# Patient Record
Sex: MALE | Race: WHITE | Employment: FULL TIME | URBAN - METROPOLITAN AREA
[De-identification: names, ages, dates, MRNs, and addresses within clinical notes are randomized per-mention and may not be internally consistent; named-entity substitution may affect disease eponyms.]

---

## 2017-01-07 ENCOUNTER — TRANSFERRED RECORDS (OUTPATIENT)
Dept: HEALTH INFORMATION MANAGEMENT | Facility: CLINIC | Age: 37
End: 2017-01-07

## 2017-02-24 ENCOUNTER — OFFICE VISIT (OUTPATIENT)
Dept: FAMILY MEDICINE | Facility: CLINIC | Age: 37
End: 2017-02-24
Payer: COMMERCIAL

## 2017-02-24 VITALS
HEIGHT: 73 IN | TEMPERATURE: 98 F | SYSTOLIC BLOOD PRESSURE: 98 MMHG | WEIGHT: 175 LBS | BODY MASS INDEX: 23.19 KG/M2 | HEART RATE: 63 BPM | DIASTOLIC BLOOD PRESSURE: 60 MMHG | OXYGEN SATURATION: 98 %

## 2017-02-24 DIAGNOSIS — F41.1 GENERALIZED ANXIETY DISORDER: ICD-10-CM

## 2017-02-24 DIAGNOSIS — F42.9 OCD (OBSESSIVE COMPULSIVE DISORDER): ICD-10-CM

## 2017-02-24 DIAGNOSIS — F41.1 ANXIETY REACTION: ICD-10-CM

## 2017-02-24 DIAGNOSIS — Z00.00 ROUTINE GENERAL MEDICAL EXAMINATION AT A HEALTH CARE FACILITY: Primary | ICD-10-CM

## 2017-02-24 DIAGNOSIS — Z80.0 FAMILY HISTORY OF COLON CANCER: ICD-10-CM

## 2017-02-24 LAB
ANION GAP SERPL CALCULATED.3IONS-SCNC: 5 MMOL/L (ref 3–14)
BUN SERPL-MCNC: 13 MG/DL (ref 7–30)
CALCIUM SERPL-MCNC: 9.3 MG/DL (ref 8.5–10.1)
CHLORIDE SERPL-SCNC: 105 MMOL/L (ref 94–109)
CHOLEST SERPL-MCNC: 218 MG/DL
CO2 SERPL-SCNC: 29 MMOL/L (ref 20–32)
CREAT SERPL-MCNC: 0.94 MG/DL (ref 0.66–1.25)
ERYTHROCYTE [DISTWIDTH] IN BLOOD BY AUTOMATED COUNT: 12.5 % (ref 10–15)
GFR SERPL CREATININE-BSD FRML MDRD: 89 ML/MIN/1.7M2
GLUCOSE SERPL-MCNC: 79 MG/DL (ref 70–99)
HCT VFR BLD AUTO: 43.6 % (ref 40–53)
HDLC SERPL-MCNC: 66 MG/DL
HGB BLD-MCNC: 15.4 G/DL (ref 13.3–17.7)
LDLC SERPL CALC-MCNC: 140 MG/DL
MCH RBC QN AUTO: 30.3 PG (ref 26.5–33)
MCHC RBC AUTO-ENTMCNC: 35.3 G/DL (ref 31.5–36.5)
MCV RBC AUTO: 86 FL (ref 78–100)
NONHDLC SERPL-MCNC: 152 MG/DL
PLATELET # BLD AUTO: 302 10E9/L (ref 150–450)
POTASSIUM SERPL-SCNC: 4.5 MMOL/L (ref 3.4–5.3)
RBC # BLD AUTO: 5.09 10E12/L (ref 4.4–5.9)
SODIUM SERPL-SCNC: 139 MMOL/L (ref 133–144)
TRIGL SERPL-MCNC: 58 MG/DL
WBC # BLD AUTO: 5 10E9/L (ref 4–11)

## 2017-02-24 PROCEDURE — 99395 PREV VISIT EST AGE 18-39: CPT | Performed by: FAMILY MEDICINE

## 2017-02-24 PROCEDURE — 36415 COLL VENOUS BLD VENIPUNCTURE: CPT | Performed by: FAMILY MEDICINE

## 2017-02-24 PROCEDURE — 85027 COMPLETE CBC AUTOMATED: CPT | Performed by: FAMILY MEDICINE

## 2017-02-24 PROCEDURE — 80048 BASIC METABOLIC PNL TOTAL CA: CPT | Performed by: FAMILY MEDICINE

## 2017-02-24 PROCEDURE — 80061 LIPID PANEL: CPT | Performed by: FAMILY MEDICINE

## 2017-02-24 RX ORDER — ESCITALOPRAM OXALATE 20 MG/1
10-20 TABLET ORAL DAILY
Qty: 90 TABLET | Refills: 3 | Status: SHIPPED | OUTPATIENT
Start: 2017-02-24 | End: 2018-04-28

## 2017-02-24 ASSESSMENT — ANXIETY QUESTIONNAIRES
3. WORRYING TOO MUCH ABOUT DIFFERENT THINGS: NOT AT ALL
GAD7 TOTAL SCORE: 2
7. FEELING AFRAID AS IF SOMETHING AWFUL MIGHT HAPPEN: SEVERAL DAYS
IF YOU CHECKED OFF ANY PROBLEMS ON THIS QUESTIONNAIRE, HOW DIFFICULT HAVE THESE PROBLEMS MADE IT FOR YOU TO DO YOUR WORK, TAKE CARE OF THINGS AT HOME, OR GET ALONG WITH OTHER PEOPLE: NOT DIFFICULT AT ALL
1. FEELING NERVOUS, ANXIOUS, OR ON EDGE: SEVERAL DAYS
5. BEING SO RESTLESS THAT IT IS HARD TO SIT STILL: NOT AT ALL
6. BECOMING EASILY ANNOYED OR IRRITABLE: NOT AT ALL
2. NOT BEING ABLE TO STOP OR CONTROL WORRYING: NOT AT ALL

## 2017-02-24 ASSESSMENT — PATIENT HEALTH QUESTIONNAIRE - PHQ9: 5. POOR APPETITE OR OVEREATING: NOT AT ALL

## 2017-02-24 NOTE — NURSING NOTE
"Chief Complaint   Patient presents with     Physical       Initial BP 98/60  Pulse 63  Temp 98  F (36.7  C) (Oral)  Ht 6' 1\" (1.854 m)  Wt 175 lb (79.4 kg)  SpO2 98%  BMI 23.09 kg/m2 Estimated body mass index is 23.09 kg/(m^2) as calculated from the following:    Height as of this encounter: 6' 1\" (1.854 m).    Weight as of this encounter: 175 lb (79.4 kg).  Medication Reconciliation: complete  "

## 2017-02-24 NOTE — MR AVS SNAPSHOT
After Visit Summary   2/24/2017    Ad Dubon    MRN: [id number]           Patient Information     Date Of Birth          1980        Visit Information        Provider Department      2/24/2017 8:00 AM Saul Neal MD [facility]        Today's Diagnoses     Routine general medical examination at a health care facility    -  1    OCD (obsessive compulsive disorder)        Generalized anxiety disorder        Family history of colon cancer - father dx at 39 yo; pt tested neg for RODRÍGUEZ ;  sister tested positive for RODRÍGUEZ syndrome        Anxiety reaction          Care Instructions      Preventive Health Recommendations  Male Ages 26 - 39    Yearly exam:             See your health care provider every year in order to  o   Review health changes.   o   Discuss preventive care.    o   Review your medicines if your doctor has prescribed any.    You should be tested each year for STDs (sexually transmitted diseases), if you re at risk.     After age 35, talk to your provider about cholesterol testing. If you are at risk for heart disease, have your cholesterol tested at least every 5 years.     If you are at risk for diabetes, you should have a diabetes test (fasting glucose).  Shots: Get a flu shot each year. Get a tetanus shot every 10 years.     Nutrition:    Eat at least 5 servings of fruits and vegetables daily.     Eat whole-grain bread, whole-wheat pasta and brown rice instead of white grains and rice.     Talk to your provider about Calcium and Vitamin D.     Lifestyle    Exercise for at least 150 minutes a week (30 minutes a day, 5 days a week). This will help you control your weight and prevent disease.     Limit alcohol to one drink per day.     No smoking.     Wear sunscreen to prevent skin cancer.     See your dentist every six months for an exam and cleaning.           Follow-ups after your visit        Follow-up notes from your care team     Return in about 1 year  "(around 2/24/2018) for Wellness Exam and fasting labs.      Who to contact     If you have questions or need follow up information about today's clinic visit or your schedule please contact [facility] directly at [phone].  Normal or non-critical lab and imaging results will be communicated to you by [employer], letter or phone within 4 business days after the clinic has received the results. If you do not hear from us within 7 days, please contact the clinic through [employer] or phone. If you have a critical or abnormal lab result, we will notify you by phone as soon as possible.  Submit refill requests through [employer] or call your pharmacy and they will forward the refill request to us. Please allow 3 business days for your refill to be completed.          Additional Information About Your Visit        [employer] Information     [employer] gives you secure access to your electronic health record. If you see a primary care provider, you can also send messages to your care team and make appointments. If you have questions, please call your primary care clinic.  If you do not have a primary care provider, please call [phone] and they will assist you.        Care EveryWhere ID     This is your Care EveryWhere ID. This could be used by other organizations to access your [place] medical records  [id number]        Your Vitals Were     Pulse Temperature Height Pulse Oximetry BMI (Body Mass Index)       63 98  F (36.7  C) (Oral) 6' 1\" (1.854 m) 98% 23.09 kg/m2        Blood Pressure from Last 3 Encounters:   02/24/17 98/60   07/09/16 128/80   08/28/15 120/72    Weight from Last 3 Encounters:   02/24/17 175 lb (79.4 kg)   12/06/16 175 lb (79.4 kg)   07/09/16 175 lb 7 oz (79.6 kg)              We Performed the Following     Basic metabolic panel     CBC with platelets     Lipid panel reflex to direct LDL          Today's Medication Changes          These changes are accurate as of: 2/24/17  8:32 AM.  If you " have any questions, ask your nurse or doctor.               These medicines have changed or have updated prescriptions.        Dose/Directions    escitalopram 20 MG tablet   Commonly known as:  LEXAPRO   This may have changed:  how much to take   Used for:  OCD (obsessive compulsive disorder), Generalized anxiety disorder   Changed by:  Saul Neal MD        Dose:  10-20 mg   Take 0.5-1 tablets (10-20 mg) by mouth daily   Quantity:  90 tablet   Refills:  3         Stop taking these medicines if you haven't already. Please contact your care team if you have questions.     albuterol 108 (90 BASE) MCG/ACT Inhaler   Commonly known as:  albuterol   Stopped by:  Saul Neal MD           azithromycin 250 MG tablet   Commonly known as:  ZITHROMAX   Stopped by:  Saul Neal MD           clonazePAM 0.5 MG tablet   Commonly known as:  klonoPIN   Stopped by:  Saul Neal MD           triamcinolone 0.5 % cream   Commonly known as:  KENALOG   Stopped by:  Saul Neal MD                Where to get your medicines      These medications were sent to [place] [address] [facility] [address]  [address] [facility] [address]     Phone:  [phone]     escitalopram 20 MG tablet                Primary Care Provider Office Phone # Fax #    Saul Neal [phone]       [address] [address]        Thank you!     Thank you for choosing [facility]  for your care. Our goal is always to provide you with excellent care. Hearing back from our patients is one way we can continue to improve our services. Please take a few minutes to complete the written survey that you may receive in the mail after your visit with us. Thank you!             Your Updated Medication List - Protect others around you: Learn how to safely use, store and throw away your medicines at www.disposemymeds.org.          This  list is accurate as of: 2/24/17  8:32 AM.  Always use your most recent med list.                   Brand Name Dispense Instructions for use    escitalopram 20 MG tablet    LEXAPRO    90 tablet    Take 0.5-1 tablets (10-20 mg) by mouth daily

## 2017-02-24 NOTE — PROGRESS NOTES
SUBJECTIVE:     CC: Ad Dubon is an 36 year old male who presents for preventative health visit.     Healthy Habits:    Do you get at least three servings of calcium containing foods daily (dairy, green leafy vegetables, etc.)? yes    Amount of exercise or daily activities, outside of work: 3-4 day(s) per week    Problems taking medications regularly No    Medication side effects: No    Have you had an eye exam in the past two years? no    Do you see a dentist twice per year? no    Do you have sleep apnea, excessive snoring or daytime drowsiness?no    Lower Back Pain: Mild pain, when picking up his kids    SOB: Pt says this has been improved and he attributes the SOB to his anxiety. He said it was worst in the Spring.     Anxiety Follow-Up    Status since last visit: Improved     Other associated symptoms:None    Complicating factors:   Significant life event: No   Current substance abuse: None  Side effects of Lexapro: gets headaches at the high dose, so he sometimes takes half of a pill  Depression symptoms: No  Patient reports that the Clonazepam is helpful to have in the summer and knowing that he has it with him help him to feel better.   TORRI-7 SCORE 5/26/2015 7/23/2015 8/28/2015   Total Score 16 5 -   Total Score - - 12        GAD7       Today's PHQ-2 Score:   PHQ-2 ( 1999 [employer]) 7/9/2016 5/26/2015   Q1: Little interest or pleasure in doing things 0 0   Q2: Feeling down, depressed or hopeless 0 0   PHQ-2 Score 0 0   Little interest or pleasure in doing things - -   Feeling down, depressed or hopeless - -   PHQ-2 Score - -       Abuse: Current or Past(Physical, Sexual or Emotional)- No  Do you feel safe in your environment - Yes    Social History   Substance Use Topics     Smoking status: Former Smoker     Quit date: 4/21/2008     Smokeless tobacco: Never Used     Alcohol use 0.0 oz/week      Comment: twice a month a couple drinks each time avg     The patient does not drink >3 drinks per day nor >7  "drinks per week.    Last PSA: No results found for: PSA    Recent Labs   Lab Test  09/29/14   0913  10/25/12   0842   CHOL  187  195   HDL  70  53   LDL  107  128   TRIG  52  67   CHOLHDLRATIO  2.7  3.7       Reviewed orders with patient. Reviewed health maintenance and updated orders accordingly - Yes    All Histories reviewed and updated in Epic.    ROS:  Constitutional, HEENT, cardiovascular, pulmonary, GI, , musculoskeletal, neuro, skin, endocrine and psych systems are negative, except as in HPI or otherwise noted     This document serves as a record of the services and decisions personally performed and made by Saul Neal MD. It was created on his behalf by Sylwia Miles, a trained medical scribe. The creation of this document is based the provider's statements to the medical scribe.  Sylwia Miles     Problem list, Medication list, Allergies, and Medical/Social/Surgical histories reviewed in [facility] and updated as appropriate.  OBJECTIVE:     BP 98/60  Pulse 63  Temp 98  F (36.7  C) (Oral)  Ht 1.854 m (6' 1\")  Wt 79.4 kg (175 lb)  SpO2 98%  BMI 23.09 kg/m2  EXAM:  GENERAL: healthy, alert and no distress  EYES: Eyes grossly normal to inspection, PERRL and conjunctivae and sclerae normal  HENT: ear canals and TM's normal, nose and mouth without ulcers or lesions  NECK: no adenopathy, no asymmetry, masses, or scars and thyroid normal to palpation  RESP: lungs clear to auscultation - no rales, rhonchi or wheezes  BREAST: normal without masses, tenderness or nipple discharge and no palpable axillary masses or adenopathy  CV: regular rate and rhythm, normal S1 S2, no S3 or S4, no murmur, no carotid bruits, click or rub, no peripheral edema and peripheral pulses strong  ABDOMEN: soft, nontender, no hepatosplenomegaly, no masses and bowel sounds normal   (male): normal male genitalia without lesions or urethral discharge, no hernia  RECTAL: normal sphincter tone, no rectal masses, prostate normal size, smooth, " "nontender without nodules or masses  MS: no gross musculoskeletal defects noted, no edema  SKIN: no suspicious lesions or rashes  NEURO: Normal strength and tone, mentation intact and speech normal  PSYCH: mentation appears normal, affect normal/bright  No results found for this or any previous visit (from the past 24 hour(s)).  ASSESSMENT/PLAN:     Ad was seen today for physical.    Diagnoses and all orders for this visit:    Routine general medical examination at a health care facility  -     Lipid panel reflex to direct LDL  -     CBC with platelets  -     Basic metabolic panel    OCD (obsessive compulsive disorder): - controlled - continue medication.  -     escitalopram (LEXAPRO) 20 MG tablet; Take 0.5-1 tablets (10-20 mg) by mouth daily    Generalized anxiety disorder: - controlled - continue medication.  -     escitalopram (LEXAPRO) 20 MG tablet; Take 0.5-1 tablets (10-20 mg) by mouth daily    Family history of colon cancer - father dx at 39 yo; pt tested neg for RODRÍGUEZ ;  sister tested positive for RODRÍGUEZ syndrome    Anxiety reaction      COUNSELING:  Reviewed preventive health counseling, as reflected in patient instructions       Regular exercise       Healthy diet/nutrition       Vision screening       Hearing screening       Colon cancer screening     reports that he quit smoking about 8 years ago. He has never used smokeless tobacco.    Estimated body mass index is 23.09 kg/(m^2) as calculated from the following:    Height as of this encounter: 1.854 m (6' 1\").    Weight as of this encounter: 79.4 kg (175 lb).     Counseling Resources:  ATP IV Guidelines  Pooled Cohorts Equation Calculator  FRAX Risk Assessment  ICSI Preventive Guidelines  Dietary Guidelines for Americans, 2010  USDA's MyPlate  ASA Prophylaxis  Lung CA Screening    The information in this document, created by the medical scribe for me, accurately reflects the services I personally performed and the decisions made by me. I have reviewed " and approved this document for accuracy prior to leaving the patient care area.  Saul Neal MD February 24, 2017 7:34 AM     Saul Neal MD  [facility] [place]

## 2017-02-25 ASSESSMENT — ANXIETY QUESTIONNAIRES: GAD7 TOTAL SCORE: 2

## 2017-02-25 ASSESSMENT — PATIENT HEALTH QUESTIONNAIRE - PHQ9: SUM OF ALL RESPONSES TO PHQ QUESTIONS 1-9: 0

## 2017-04-24 ENCOUNTER — OFFICE VISIT (OUTPATIENT)
Dept: FAMILY MEDICINE | Facility: CLINIC | Age: 37
End: 2017-04-24
Payer: COMMERCIAL

## 2017-04-24 VITALS
OXYGEN SATURATION: 99 % | HEART RATE: 58 BPM | WEIGHT: 178 LBS | SYSTOLIC BLOOD PRESSURE: 104 MMHG | TEMPERATURE: 97.8 F | DIASTOLIC BLOOD PRESSURE: 80 MMHG | BODY MASS INDEX: 23.59 KG/M2 | HEIGHT: 73 IN

## 2017-04-24 DIAGNOSIS — H10.023 PINK EYE DISEASE OF BOTH EYES: Primary | ICD-10-CM

## 2017-04-24 PROCEDURE — 99213 OFFICE O/P EST LOW 20 MIN: CPT | Performed by: FAMILY MEDICINE

## 2017-04-24 RX ORDER — TOBRAMYCIN 3 MG/ML
1 SOLUTION/ DROPS OPHTHALMIC EVERY 4 HOURS
Qty: 1 BOTTLE | Refills: 0 | Status: SHIPPED | OUTPATIENT
Start: 2017-04-24 | End: 2017-05-01

## 2017-04-24 NOTE — NURSING NOTE
"Chief Complaint   Patient presents with     Eye Problem       Initial /80 (BP Location: Right arm, Patient Position: Chair, Cuff Size: Adult Large)  Pulse 58  Temp 97.8  F (36.6  C) (Oral)  Ht 6' 1\" (1.854 m)  Wt 178 lb (80.7 kg)  SpO2 99%  BMI 23.48 kg/m2 Estimated body mass index is 23.48 kg/(m^2) as calculated from the following:    Height as of this encounter: 6' 1\" (1.854 m).    Weight as of this encounter: 178 lb (80.7 kg).  Medication Reconciliation: complete  "

## 2017-04-24 NOTE — PATIENT INSTRUCTIONS
Conjunctivitis  What is eye inflammation?   The clear membrane that lines the inside of the eyelids and covers the white of the eye (conjunctiva) can get red and swollen. This is called conjunctivitis.   How does it occur?   Conjunctivitis can be caused by many things, including infection by viruses or bacteria. Many kinds of bacteria can cause conjunctivitis. These include bacteria that cause strep, staph, and STD infections.   Conjunctivitis caused by a virus is sometimes called pink eye. It can be spread easily to other people. The same viruses that cause the common cold can cause viral conjunctivitis. Viruses can be spread by coughing or sneezing and can get in your eyes through contact with infected:  hands   washcloths or towels   cosmetics   false eyelashes   soft contact lenses  Avoid unnecessary contact with others so that you do not spread the disease.   What are the symptoms?   Symptoms may include:  itchy or scratchy eyes   redness   painful sensitivity to light   swelling of eyelids   matting of eyelashes   watery or pus discharge  How is it diagnosed?   Your healthcare provider will ask about your medical history and if you have been near someone who has conjunctivitis. Your provider will examine your eyes. He or she will also check for enlarged lymph nodes near your ear and jaw. Your provider may get lab tests of a sample of the pus to see what type of germs are present.  How is it treated?   Like a cold, viral conjunctivitis will usually go away on its own without treatment. However, your healthcare provider may prescribe eyedrops to help control your symptoms. Antihistamine pills may also relieve the itching and redness.  If you have bacterial conjunctivitis, your healthcare provider will prescribe antibiotic eyedrops. You can also help your eyes get better by washing them gently to remove any pus or crusts. Then dry them gently with a clean towel.  For very severe forms of  conjunctivitis, antibiotics may need to be given by mouth or with a shot or an IV.  If you wear contact lenses, you will need to stop wearing them until your eyes are healed. The combination of contacts and conjunctivitis may damage your cornea (the clear outer layer on the front of your eye) and cause severe vision problems. Your provider may ask you to throw away your current contact lenses and lens case.  How long will the effects last?   Viral conjunctivitis usually gets worse 5 to 7 days after the first symptoms. It can get better in 10 days to 1 month. If only one eye is affected at first, the other eye may become infected up to 2 weeks later. Usually, if both eyes are affected, the first eye has worse conjunctivitis than the second.  Bacterial conjunctivitis should improve within 2 days after you begin using antibiotics. If your eyes are not better after 3 days of antibiotics, call your healthcare provider.  How can I prevent conjunctivitis?   To keep from getting conjunctivitis from someone who has it, or to keep from spreading it to others, follow these guidelines:  Wash your hands often. Do not touch or rub your eyes.   Never share eye makeup or cosmetics with anyone. When you have conjunctivitis, throw out eye makeup you have been using.   Never use eye medicine that has been prescribed for someone else.   Do not share towels, washcloths, pillows, or sheets with anyone. If one of your eyes is affected but not the other, use a separate towel for each eye.   Avoid swimming in swimming pools if you have conjunctivitis.   Avoid close contact with people until your symptoms improve. Depending on your job, you may be asked to take some time off from work.  When should I call my healthcare provider?   Call your provider if:  You have any severe eye pain.   Your symptoms do not improve after you have used your medicine for 3 days (if you have bacterial conjunctivitis).   Your symptoms do not improve after 2 weeks  (if you have viral conjunctivitis).   Your eyes get very sensitive to light, even after the redness is gone.  Reviewed for medical accuracy by faculty at the [facility]. Web site: [email or web address]

## 2017-04-24 NOTE — MR AVS SNAPSHOT
After Visit Summary   4/24/2017    Ad Dubon    MRN: [id number]           Patient Information     Date Of Birth          1980        Visit Information        Provider Department      4/24/2017 3:00 PM Saul Neal MD [facility] [place]        Today's Diagnoses     Pink eye disease of both eyes    -  1      Care Instructions                    Conjunctivitis  What is eye inflammation?   The clear membrane that lines the inside of the eyelids and covers the white of the eye (conjunctiva) can get red and swollen. This is called conjunctivitis.   How does it occur?   Conjunctivitis can be caused by many things, including infection by viruses or bacteria. Many kinds of bacteria can cause conjunctivitis. These include bacteria that cause strep, staph, and STD infections.   Conjunctivitis caused by a virus is sometimes called pink eye. It can be spread easily to other people. The same viruses that cause the common cold can cause viral conjunctivitis. Viruses can be spread by coughing or sneezing and can get in your eyes through contact with infected:  hands   washcloths or towels   cosmetics   false eyelashes   soft contact lenses  Avoid unnecessary contact with others so that you do not spread the disease.   What are the symptoms?   Symptoms may include:  itchy or scratchy eyes   redness   painful sensitivity to light   swelling of eyelids   matting of eyelashes   watery or pus discharge  How is it diagnosed?   Your healthcare provider will ask about your medical history and if you have been near someone who has conjunctivitis. Your provider will examine your eyes. He or she will also check for enlarged lymph nodes near your ear and jaw. Your provider may get lab tests of a sample of the pus to see what type of germs are present.  How is it treated?   Like a cold, viral conjunctivitis will usually go away on its own without treatment. However, your healthcare provider may prescribe  eyedrops to help control your symptoms. Antihistamine pills may also relieve the itching and redness.  If you have bacterial conjunctivitis, your healthcare provider will prescribe antibiotic eyedrops. You can also help your eyes get better by washing them gently to remove any pus or crusts. Then dry them gently with a clean towel.  For very severe forms of conjunctivitis, antibiotics may need to be given by mouth or with a shot or an IV.  If you wear contact lenses, you will need to stop wearing them until your eyes are healed. The combination of contacts and conjunctivitis may damage your cornea (the clear outer layer on the front of your eye) and cause severe vision problems. Your provider may ask you to throw away your current contact lenses and lens case.  How long will the effects last?   Viral conjunctivitis usually gets worse 5 to 7 days after the first symptoms. It can get better in 10 days to 1 month. If only one eye is affected at first, the other eye may become infected up to 2 weeks later. Usually, if both eyes are affected, the first eye has worse conjunctivitis than the second.  Bacterial conjunctivitis should improve within 2 days after you begin using antibiotics. If your eyes are not better after 3 days of antibiotics, call your healthcare provider.  How can I prevent conjunctivitis?   To keep from getting conjunctivitis from someone who has it, or to keep from spreading it to others, follow these guidelines:  Wash your hands often. Do not touch or rub your eyes.   Never share eye makeup or cosmetics with anyone. When you have conjunctivitis, throw out eye makeup you have been using.   Never use eye medicine that has been prescribed for someone else.   Do not share towels, washcloths, pillows, or sheets with anyone. If one of your eyes is affected but not the other, use a separate towel for each eye.   Avoid swimming in swimming pools if you have conjunctivitis.   Avoid close contact with people  until your symptoms improve. Depending on your job, you may be asked to take some time off from work.  When should I call my healthcare provider?   Call your provider if:  You have any severe eye pain.   Your symptoms do not improve after you have used your medicine for 3 days (if you have bacterial conjunctivitis).   Your symptoms do not improve after 2 weeks (if you have viral conjunctivitis).   Your eyes get very sensitive to light, even after the redness is gone.  Reviewed for medical accuracy by faculty at the [facility]. Web site: [email or web address]            Follow-ups after your visit        Follow-up notes from your care team     Return in about 2 days (around 4/26/2017), or if symptoms worsen or fail to improve.      Who to contact     If you have questions or need follow up information about today's clinic visit or your schedule please contact [facility] directly at [phone].  Normal or non-critical lab and imaging results will be communicated to you by [employer], letter or phone within 4 business days after the clinic has received the results. If you do not hear from us within 7 days, please contact the clinic through [employer] or phone. If you have a critical or abnormal lab result, we will notify you by phone as soon as possible.  Submit refill requests through [employer] or call your pharmacy and they will forward the refill request to us. Please allow 3 business days for your refill to be completed.          Additional Information About Your Visit        [employer] Information     [employer] gives you secure access to your electronic health record. If you see a primary care provider, you can also send messages to your care team and make appointments. If you have questions, please call your primary care clinic.  If you do not have a primary care provider, please call [phone] and they will assist you.        Care EveryWhere ID     This is  "your Care EveryWhere ID. This could be used by other organizations to access your [facility] medical records  [id number]        Your Vitals Were     Pulse Temperature Height Pulse Oximetry BMI (Body Mass Index)       58 97.8  F (36.6  C) (Oral) 6' 1\" (1.854 m) 99% 23.48 kg/m2        Blood Pressure from Last 3 Encounters:   04/24/17 104/80   02/24/17 98/60   07/09/16 128/80    Weight from Last 3 Encounters:   04/24/17 178 lb (80.7 kg)   02/24/17 175 lb (79.4 kg)   12/06/16 175 lb (79.4 kg)              Today, you had the following     No orders found for display         Today's Medication Changes          These changes are accurate as of: 4/24/17  3:11 PM.  If you have any questions, ask your nurse or doctor.               Start taking these medicines.        Dose/Directions    tobramycin 0.3 % ophthalmic solution   Commonly known as:  TOBREX   Used for:  Pink eye disease of both eyes   Started by:  Saul Neal MD        Dose:  1 drop   Apply 1 drop to eye every 4 hours for 7 days   Quantity:  1 Bottle   Refills:  0            Where to get your medicines      Some of these will need a paper prescription and others can be bought over the counter.  Ask your nurse if you have questions.     Bring a paper prescription for each of these medications     tobramycin 0.3 % ophthalmic solution                Primary Care Provider Office Phone # Fax #    Saul Neal -[phone]       [address] [address]        Thank you!     Thank you for choosing [facility]  for your care. Our goal is always to provide you with excellent care. Hearing back from our patients is one way we can continue to improve our services. Please take a few minutes to complete the written survey that you may receive in the mail after your visit with us. Thank you!             Your Updated Medication List - Protect others around you: Learn how to safely use, store " and throw away your medicines at www.disposemymeds.org.          This list is accurate as of: 4/24/17  3:11 PM.  Always use your most recent med list.                   Brand Name Dispense Instructions for use    escitalopram 20 MG tablet    LEXAPRO    90 tablet    Take 0.5-1 tablets (10-20 mg) by mouth daily       tobramycin 0.3 % ophthalmic solution    TOBREX    1 Bottle    Apply 1 drop to eye every 4 hours for 7 days

## 2017-04-24 NOTE — PROGRESS NOTES
"  SUBJECTIVE:                                                    Ad Dubon is a 37 year old male who presents to clinic today for the following health issues:    Acute Illness   Acute illness concerns: Eye problem   Onset: 5 days    Fever: no    Chills/Sweats: no    Headache (location?): no    Sinus Pressure:no    Conjunctivitis:  YES- both - red and itchy with pain    Ear Pain: no    Rhinorrhea: no    Congestion: no    Using glasses some    Sore Throat: no    Eyes are aching    Allergies: no, he took a benadryl without relief     Cough: no    Wheeze: no    Decreased Appetite: no    Nausea: no    Vomiting: no    Diarrhea:  no    Dysuria/Freq.: no    Fatigue/Achiness: no    Sick/Strep Exposure: YES- pt wife had pink eye, kids do not have pink eye     Therapies Tried and outcome: pt used wife's cream - Gentamicin ointment and they got worse    Patient reports that he has gained weight. He says he has not been eating as healthily.     Problem list and histories reviewed & adjusted, as indicated.  Additional history: as documented    ROS:  Constitutional, HEENT, cardiovascular, pulmonary, GI, , musculoskeletal, neuro, skin, endocrine and psych systems are negative, except as otherwise noted.    This document serves as a record of the services and decisions personally performed and made by Saul Neal MD. It was created on his behalf by Sylwia Miles, a trained medical scribe. The creation of this document is based the provider's statements to the medical scribe.  Sylwia Miles   OBJECTIVE:                                                    /80 (BP Location: Right arm, Patient Position: Chair, Cuff Size: Adult Large)  Pulse 58  Temp 97.8  F (36.6  C) (Oral)  Ht 1.854 m (6' 1\")  Wt 80.7 kg (178 lb)  SpO2 99%  BMI 23.48 kg/m2 Body mass index is 23.48 kg/(m^2).   GENERAL: healthy, alert, well nourished, well hydrated, no distress  EYES: conjunctival injection bilaterally, otherwise, Eyes grossly " normal to inspection, extraocular movements - intact, and PERRL  HENT: ear canals- normal; TMs- normal; Nose- normal; Mouth- no ulcers, no lesions  NECK: no tenderness, no adenopathy, no asymmetry, no masses, no stiffness; thyroid- normal to palpation  PSYCH: Alert and oriented times 3; speech- coherent , normal rate and volume; able to articulate logical thoughts, able to abstract reason, no tangential thoughts, no hallucinations or delusions, affect- normal  Diagnostic test results: none      ASSESSMENT/PLAN:         Ad was seen today for eye problem.    Diagnoses and all orders for this visit:    Pink eye disease of both eyes: Patient advised to use warm packs, wash hands, and start tobramycin if symptoms persist after a 2-3 days.   -     tobramycin (TOBREX) 0.3 % ophthalmic solution; Apply 1 drop to eye every 4 hours for 7 days    Risks, benefits and alternatives of treatments discussed. Plan agreed on.      Followup: prn    Will call, return to clinic, or go to ED if worsening or symptoms not improving as discussed.    See patient instructions.     The patient has no Health Maintenance topics of status Overdue, Due On, or Due Soon    Health maintenance reviewed/updated? Yes    The information in this document, created by the medical scribe for me, accurately reflects the services I personally performed and the decisions made by me. I have reviewed and approved this document for accuracy prior to leaving the patient care area.  Saul Neal MD April 24, 2017 3:01 PM        Riki Neal MD

## 2018-01-29 ENCOUNTER — TRANSFERRED RECORDS (OUTPATIENT)
Dept: HEALTH INFORMATION MANAGEMENT | Facility: CLINIC | Age: 38
End: 2018-01-29

## 2018-04-28 DIAGNOSIS — F42.9 OCD (OBSESSIVE COMPULSIVE DISORDER): ICD-10-CM

## 2018-04-28 DIAGNOSIS — F41.1 GENERALIZED ANXIETY DISORDER: ICD-10-CM

## 2018-04-30 RX ORDER — ESCITALOPRAM OXALATE 20 MG/1
TABLET ORAL
Qty: 30 TABLET | Refills: 0 | Status: SHIPPED | OUTPATIENT
Start: 2018-04-30 | End: 2018-06-23

## 2018-04-30 NOTE — TELEPHONE ENCOUNTER
"Requested Prescriptions   Pending Prescriptions Disp Refills     escitalopram (LEXAPRO) 20 MG tablet [Pharmacy Med Name: ESCITALOPRAM 20 MG TABLET] 90 tablet 0        Last Written Prescription Date:  2.24.17  Last Fill Quantity: 90,  # refills: 3   Last office visit: 4/24/2017 with prescribing provider:     Future Office Visit:      PHQ-9 SCORE 2/5/2013 7/23/2015 2/24/2017   Total Score 3 3 -   Total Score - - 0     TORRI-7 SCORE 7/23/2015 8/28/2015 2/24/2017   Total Score 5 - -   Total Score - 12 2          Sig: TAKE HALF TO ONE TABLET BY MOUTH DAILY    SSRIs Protocol Failed    4/28/2018 11:05 AM       Failed - Recent (12 mo) or future (30 days) visit within the authorizing provider's specialty    Patient had office visit in the last 12 months or has a visit in the next 30 days with authorizing provider or within the authorizing provider's specialty.  See \"Patient Info\" tab in inbasket, or \"Choose Columns\" in Meds & Orders section of the refill encounter.           Passed - Patient is age 18 or older          "

## 2018-04-30 NOTE — TELEPHONE ENCOUNTER
Patient due for fasting physical - no future appt scheduled  30 day supply sent with note to schedule    Gabrielle Grider RN  [facility]

## 2018-06-23 DIAGNOSIS — F42.9 OBSESSIVE-COMPULSIVE DISORDER, UNSPECIFIED TYPE: Primary | ICD-10-CM

## 2018-06-23 DIAGNOSIS — F41.1 GENERALIZED ANXIETY DISORDER: ICD-10-CM

## 2018-06-26 RX ORDER — ESCITALOPRAM OXALATE 20 MG/1
TABLET ORAL
Qty: 30 TABLET | Refills: 0 | Status: SHIPPED | OUTPATIENT
Start: 2018-06-26 | End: 2018-07-03

## 2018-06-26 NOTE — TELEPHONE ENCOUNTER
"Last Written Prescription Date:  4/30/2018  Last Fill Quantity: 30,  # refills: 0   Last office visit: 4/24/2017 with prescribing provider:  Saul Neal   Future Office Visit:    Requested Prescriptions   Pending Prescriptions Disp Refills     escitalopram (LEXAPRO) 20 MG tablet [Pharmacy Med Name: ESCITALOPRAM 20 MG TABLET] 30 tablet 0     Sig: TAKE 1/2 - 1 TABLET BY MOUTH ONCE DAILY. DUE FOR OFFICE VISIT    SSRIs Protocol Failed    6/25/2018  8:44 AM       Failed - Recent (12 mo) or future (30 days) visit within the authorizing provider's specialty    Patient had office visit in the last 12 months or has a visit in the next 30 days with authorizing provider or within the authorizing provider's specialty.  See \"Patient Info\" tab in inbasket, or \"Choose Columns\" in Meds & Orders section of the refill encounter.           Passed - Patient is age 18 or older          "

## 2018-06-26 NOTE — TELEPHONE ENCOUNTER
Routing refill request to provider for review/approval because:  Patient needs to be seen because it has been more than 1 year since last office visit.    Lianna EisenbergRN BSN  [facility]  [phone]

## 2018-06-26 NOTE — TELEPHONE ENCOUNTER
He has been given 30 day period to make an appointment - #30 tabs -sent - please set up appointment in the next 2 weeks.  further refills will be given then.

## 2018-07-03 ENCOUNTER — OFFICE VISIT (OUTPATIENT)
Dept: FAMILY MEDICINE | Facility: CLINIC | Age: 38
End: 2018-07-03
Payer: COMMERCIAL

## 2018-07-03 VITALS
DIASTOLIC BLOOD PRESSURE: 80 MMHG | HEIGHT: 73 IN | OXYGEN SATURATION: 98 % | SYSTOLIC BLOOD PRESSURE: 116 MMHG | HEART RATE: 62 BPM | WEIGHT: 185 LBS | BODY MASS INDEX: 24.52 KG/M2 | TEMPERATURE: 98.4 F

## 2018-07-03 DIAGNOSIS — F42.9 OBSESSIVE-COMPULSIVE DISORDER, UNSPECIFIED TYPE: ICD-10-CM

## 2018-07-03 DIAGNOSIS — G47.00 INSOMNIA, UNSPECIFIED TYPE: ICD-10-CM

## 2018-07-03 DIAGNOSIS — L30.9 DERMATITIS: ICD-10-CM

## 2018-07-03 DIAGNOSIS — F41.1 GENERALIZED ANXIETY DISORDER: Primary | ICD-10-CM

## 2018-07-03 DIAGNOSIS — M54.50 RIGHT-SIDED LOW BACK PAIN WITHOUT SCIATICA, UNSPECIFIED CHRONICITY: ICD-10-CM

## 2018-07-03 PROCEDURE — 99214 OFFICE O/P EST MOD 30 MIN: CPT | Performed by: FAMILY MEDICINE

## 2018-07-03 RX ORDER — ESZOPICLONE 3 MG/1
3 TABLET, FILM COATED ORAL AT BEDTIME
Qty: 30 TABLET | Refills: 0 | Status: SHIPPED | OUTPATIENT
Start: 2018-07-03 | End: 2018-10-27

## 2018-07-03 RX ORDER — ESCITALOPRAM OXALATE 20 MG/1
20 TABLET ORAL DAILY
Qty: 90 TABLET | Refills: 1 | Status: SHIPPED | OUTPATIENT
Start: 2018-07-03 | End: 2018-07-10

## 2018-07-03 RX ORDER — TRIAMCINOLONE ACETONIDE 1 MG/G
CREAM TOPICAL
Qty: 30 G | Refills: 1 | Status: SHIPPED | OUTPATIENT
Start: 2018-07-03 | End: 2019-02-12

## 2018-07-03 NOTE — PROGRESS NOTES
"  SUBJECTIVE:                                                    Ad Dubon is a 38 year old male who presents to clinic today for the following health issues:    Depression and Anxiety Follow-Up    Status since last visit: Not being able to sleep at all.     Other associated symptoms:None    Complicating factors:     Significant life event: No     Current substance abuse: None    - Pt reports difficulty falling asleep, occurring 3-4 times/week. He feels like he can shut off his thoughts/mind, but is unable to actually fall asleep.    > Drinks caffeine daily: coffee in the morning, 2-3 sodas throughout the day   > Other than his insomnia, Pt states he is doing well managing his anxiety.    PHQ-9  English  PHQ-9 2/24/2017   Total Score 0   Q9: Suicide Ideation Not at all     TORRI-7  TORRI-7 SCORE 7/23/2015 8/28/2015 2/24/2017   Total Score 5 - -   Total Score - 12 2   Suicide Assessment Five-step Evaluation and Treatment (SAFE-T)      Lower Back Pain -- Pt states that his lower back has been bothering him for about 3 weeks. He was shaking out laundry when he experienced a spasm -- tried to use stretches & a massage ball, but the spasms/pain returned. Denies any radiation of symptoms in his legs.         Problem list and histories reviewed & adjusted, as indicated.  Additional history: as documented    ROS:  Constitutional, HEENT, cardiovascular, pulmonary, GI, , musculoskeletal, neuro, skin, endocrine and psych systems are negative, except as otherwise noted.    This document serves as a record of the services and decisions personally performed and made by Saul Neal MD. It was created on his behalf by Marium Wilson, a trained medical scribe. The creation of this document is based the provider's statements to the medical scribe.  Scribe Marium Wilson 2:42 PM, July 3, 2018    OBJECTIVE:                                                    /80  Pulse 62  Temp 98.4  F (36.9  C) (Oral)  Ht 1.854 m (6' 1\")  " Wt 83.9 kg (185 lb)  SpO2 98%  BMI 24.41 kg/m2 Body mass index is 24.41 kg/(m^2).   GENERAL: healthy, alert, well nourished, well hydrated, no distress  HENT: ear canals- normal; TMs- normal; Nose- normal; Mouth- no ulcers, no lesions  NECK: no tenderness, no adenopathy, no asymmetry, no masses, no stiffness; thyroid- normal to palpation  RESP: lungs clear to auscultation - no rales, no rhonchi, no wheezes  CV: regular rates and rhythm, normal S1 S2, no S3 or S4 and no murmur, no click or rub -  ABDOMEN: soft, no tenderness, no  hepatosplenomegaly, no masses, normal bowel sounds  MS: extremities- no gross deformities noted, no edema  SKIN: Multiple white, scaling patches across bilateral palms; otherwise no suspicious lesions, no rashes  PSYCH: Alert and oriented times 3; speech- coherent , normal rate and volume; able to articulate logical thoughts, able to abstract reason, no tangential thoughts, no hallucinations or delusions, affect- normal    Diagnostic test results:  None     ASSESSMENT/PLAN:         Ad was seen today for recheck medication.    Diagnoses and all orders for this visit:    Generalized anxiety disorder/Obsessive-compulsive disorder, unspecified type - Controlled, continue medication  -     escitalopram (LEXAPRO) 20 MG tablet; Take 1 tablet (20 mg) by mouth daily    Insomnia, unspecified type - Pt will use medication as needed for sleep, advised if the problem persists past 2-3 months to schedule sleep therapy appointment; Also encouraged to discontinue any caffeine intake after 12p  -     eszopiclone (LUNESTA) 3 MG tablet; Take 1 tablet (3 mg) by mouth At Bedtime    Right-sided low back pain without sciatica, unspecified chronicity - Advised to continue stretches & massage to alleviate    Dermatitis - Encouraged to wash hands less, will also apply medicated cream to alleviate dryness on hands  -     triamcinolone (KENALOG) 0.1 % cream; Apply sparingly to affected area three times daily for  "14 days.        Risks, benefits and alternatives of treatments discussed. Plan agreed on.      Followup: Return in about 2 months (around 9/3/2018).    See patient instructions.       BMI:   Estimated body mass index is 24.41 kg/(m^2) as calculated from the following:    Height as of this encounter: 1.854 m (6' 1\").    Weight as of this encounter: 83.9 kg (185 lb).         The information in this document, created by the medical scribe for me, accurately reflects the services I personally performed and the decisions made by me. I have reviewed and approved this document for accuracy prior to leaving the patient care area.  2:42 PM, 07/03/18        Riki Neal MD   Pager: [phone]    "

## 2018-07-03 NOTE — PATIENT INSTRUCTIONS
Insomnia  What is insomnia?   Having insomnia means you often have trouble falling or staying asleep or going back to sleep if you awaken. Insomnia can be either a short-term or a long-term problem. Insomnia affects 1 in 3 adults every year in the [place].     Causes of insomnia include:   stress such as a big deadline at work, a financial problem, or a sick family member   being overweight   depression, anxiety, or other mental health problems   medical problems such as sleep apnea or hyperthyroidism   restless leg syndrome (muscles in your lower legs twitch or tense up during sleep).   use of caffeine or other stimulants   use of alcohol, other depressants, or sedatives, which can relax you but lead to shallow sleep that starts and stops, especially if you use these drugs for a long time   medicines, such as those used to treat asthma   pain and other discomfort caused by an illness such as arthritis   shortness of breath caused by chronic obstructive pulmonary disease (COPD) or heart failure   poor sleep habits, including going to bed at different times or in a noisy environment, or eating or working in bed before sleeping   changes in sleep patterns because of different work hours or travel (jet lag)   Insomnia may be temporary (called situational insomnia) or ongoing (chronic insomnia).   Situational insomnia occurs with a stressful event. It is often caused by noise, pain, worry, or family, work, financial, or school problems. It lasts 3 weeks or less. This kind of insomnia generally goes away when the stressful event is over or resolved.   Chronic insomnia can be caused by irregular sleep-wake patterns resulting from shift work, drug dependency (including long-term use of sleeping pills or alcohol), stress, illness, or mental health problems such as anxiety or depression. It lasts longer than 3 weeks and requires treatment of the underlying problem.   What are the symptoms?   Symptoms  include:   trouble falling asleep (taking longer than 45 minutes)   awakening often in the night   waking up early in the morning and being unable to go back to sleep   not feeling rested in the morning or feeling tired during the day   restlessness or anxiety as bedtime approaches   How is it diagnosed?   Your healthcare provider will ask you about:   your sleep patterns   use of caffeine, alcohol, medicine, and other drugs   eating and exercise habits   your mental and physical condition   your medical and mental health history, and your family's history   your job and travel patterns   Your healthcare provider may also ask your spouse, bed partner, or other family members about your sleep habits. After talking with you, your healthcare provider may give you a physical exam. A blood sample may be taken for lab tests.   Your healthcare provider may ask you to take notes each morning about:   how long you were in bed   how much time you think you actually slept   how many times and what times you woke up   what time you got up in the morning   your thoughts about the quality of your sleep   recent stresses   Your healthcare provider may suggest that you sleep overnight in a sleep center. At the sleep center you may have a continuous, all-night recording of your breathing, eye movements, muscle tone, blood oxygen levels, heart rate and rhythm, and brain waves.   How is it treated?   If a medical problem is causing your insomnia, your provider will treat you for it. If drug or alcohol abuse is the cause of your insomnia, you will need to stop using these substances. If you have chronic insomnia, it must be treated with management of the underlying problem.   In some cases of temporary insomnia, your healthcare provider may prescribe medicine to help you sleep until the stressful event is over or resolved. Counseling may also help you deal with psychological problems or reduce stress that may cause or contribute to  "your insomnia.   Some sleeping medicine can be addictive. Your healthcare provider will work with you to choose the right medicine for short-term or long-term use.   Your healthcare provider may recommend relaxation techniques, changes in diet, cutting out caffeine, and a healthy lifestyle that includes exercise. Your provider also will probably discuss good sleep habits and a regular sleep routine.   How long will the effects last?   Often insomnia lasts for just a few nights. If you cannot sleep almost every night for 2 weeks, tell your healthcare provider. Insomnia that lasts this long usually continues until the cause is identified and treated.   How can I take care of myself?   Tell your healthcare provider if the treatment plan doesn't help.   Tell your provider if you have side effects from medicine prescribed for the insomnia.   Follow your provider's instructions for follow-up visits.   How can I help prevent insomnia?   Practice good sleep hygiene:   Use the bedroom only for sleep and sex, not for reading or watching TV.   Keep the room dark and the temperature comfortable.   Consider listening to white noise, such as a fan blowing.   Keep active during the day. Exercise and get some fresh air.   Stick to a routine of going to bed and getting up at the same time each day.   Limit daytime naps to no more than 1 hour each day.   Avoid caffeine late in the day.   If you eat late at night, keep it light.   Keep a healthy weight. Being overweight may cause tiredness during the day and may worsen sleep apnea.   Stop smoking.   Try to reduce stress in your life by changing the things that cause stress.   Keep a \"to do\" journal. Before you go to bed, write down all the things you are worrying about. Then write down what you can do tomorrow. Royce the other things as things to do later in the week. This will help clear your mind of worry.   Arrange your medicine schedule with your provider so that you take any drugs " that might make you sleepy in the evening and drugs that may interfere with sleep during the day.   Avoid daily use of sleep medicines. You may become dependent on them or build up your tolerance to them so that they no longer work as well. Most sleeping pills should not be used for more than 2 weeks in a row.   Try not to focus on falling asleep. For example, don't keep checking the clock and worry about why you are not asleep yet. If you are awake for more than 30 minutes, leave the bed and do not go back to bed until you feel ready to sleep.         Published by [employer].  This content is reviewed periodically and is subject to change as new health information becomes available. The information is intended to inform and educate and is not a replacement for medical evaluation, advice, diagnosis or treatment by a healthcare professional.   Developed by Elda Leger RN, [place], and [employer].   ? 2010 [facility] and/or its affiliates. All Rights Reserved.   Copyright   [employer] Reference Systems 2011  Adult Health Advisor

## 2018-07-03 NOTE — MR AVS SNAPSHOT
After Visit Summary   7/3/2018    Ad Dubon    MRN: [id number]           Patient Information     Date Of Birth          1980        Visit Information        Provider Department      7/3/2018 2:00 PM Saul Neal MD [facility]        Today's Diagnoses     Generalized anxiety disorder    -  1    Obsessive-compulsive disorder, unspecified type        Insomnia, unspecified type        Right-sided low back pain without sciatica, unspecified chronicity        Dermatitis          Care Instructions                   Insomnia  What is insomnia?   Having insomnia means you often have trouble falling or staying asleep or going back to sleep if you awaken. Insomnia can be either a short-term or a long-term problem. Insomnia affects 1 in 3 adults every year in the [place].     Causes of insomnia include:   stress such as a big deadline at work, a financial problem, or a sick family member   being overweight   depression, anxiety, or other mental health problems   medical problems such as sleep apnea or hyperthyroidism   restless leg syndrome (muscles in your lower legs twitch or tense up during sleep).   use of caffeine or other stimulants   use of alcohol, other depressants, or sedatives, which can relax you but lead to shallow sleep that starts and stops, especially if you use these drugs for a long time   medicines, such as those used to treat asthma   pain and other discomfort caused by an illness such as arthritis   shortness of breath caused by chronic obstructive pulmonary disease (COPD) or heart failure   poor sleep habits, including going to bed at different times or in a noisy environment, or eating or working in bed before sleeping   changes in sleep patterns because of different work hours or travel (jet lag)   Insomnia may be temporary (called situational insomnia) or ongoing (chronic insomnia).   Situational insomnia occurs with a stressful event. It is often  caused by noise, pain, worry, or family, work, financial, or school problems. It lasts 3 weeks or less. This kind of insomnia generally goes away when the stressful event is over or resolved.   Chronic insomnia can be caused by irregular sleep-wake patterns resulting from shift work, drug dependency (including long-term use of sleeping pills or alcohol), stress, illness, or mental health problems such as anxiety or depression. It lasts longer than 3 weeks and requires treatment of the underlying problem.   What are the symptoms?   Symptoms include:   trouble falling asleep (taking longer than 45 minutes)   awakening often in the night   waking up early in the morning and being unable to go back to sleep   not feeling rested in the morning or feeling tired during the day   restlessness or anxiety as bedtime approaches   How is it diagnosed?   Your healthcare provider will ask you about:   your sleep patterns   use of caffeine, alcohol, medicine, and other drugs   eating and exercise habits   your mental and physical condition   your medical and mental health history, and your family's history   your job and travel patterns   Your healthcare provider may also ask your spouse, bed partner, or other family members about your sleep habits. After talking with you, your healthcare provider may give you a physical exam. A blood sample may be taken for lab tests.   Your healthcare provider may ask you to take notes each morning about:   how long you were in bed   how much time you think you actually slept   how many times and what times you woke up   what time you got up in the morning   your thoughts about the quality of your sleep   recent stresses   Your healthcare provider may suggest that you sleep overnight in a sleep center. At the sleep center you may have a continuous, all-night recording of your breathing, eye movements, muscle tone, blood oxygen levels, heart rate and rhythm, and brain waves.   How is it treated?    If a medical problem is causing your insomnia, your provider will treat you for it. If drug or alcohol abuse is the cause of your insomnia, you will need to stop using these substances. If you have chronic insomnia, it must be treated with management of the underlying problem.   In some cases of temporary insomnia, your healthcare provider may prescribe medicine to help you sleep until the stressful event is over or resolved. Counseling may also help you deal with psychological problems or reduce stress that may cause or contribute to your insomnia.   Some sleeping medicine can be addictive. Your healthcare provider will work with you to choose the right medicine for short-term or long-term use.   Your healthcare provider may recommend relaxation techniques, changes in diet, cutting out caffeine, and a healthy lifestyle that includes exercise. Your provider also will probably discuss good sleep habits and a regular sleep routine.   How long will the effects last?   Often insomnia lasts for just a few nights. If you cannot sleep almost every night for 2 weeks, tell your healthcare provider. Insomnia that lasts this long usually continues until the cause is identified and treated.   How can I take care of myself?   Tell your healthcare provider if the treatment plan doesn't help.   Tell your provider if you have side effects from medicine prescribed for the insomnia.   Follow your provider's instructions for follow-up visits.   How can I help prevent insomnia?   Practice good sleep hygiene:   Use the bedroom only for sleep and sex, not for reading or watching TV.   Keep the room dark and the temperature comfortable.   Consider listening to white noise, such as a fan blowing.   Keep active during the day. Exercise and get some fresh air.   Stick to a routine of going to bed and getting up at the same time each day.   Limit daytime naps to no more than 1 hour each day.   Avoid caffeine late in the day.   If you eat  "late at night, keep it light.   Keep a healthy weight. Being overweight may cause tiredness during the day and may worsen sleep apnea.   Stop smoking.   Try to reduce stress in your life by changing the things that cause stress.   Keep a \"to do\" journal. Before you go to bed, write down all the things you are worrying about. Then write down what you can do tomorrow. Royce the other things as things to do later in the week. This will help clear your mind of worry.   Arrange your medicine schedule with your provider so that you take any drugs that might make you sleepy in the evening and drugs that may interfere with sleep during the day.   Avoid daily use of sleep medicines. You may become dependent on them or build up your tolerance to them so that they no longer work as well. Most sleeping pills should not be used for more than 2 weeks in a row.   Try not to focus on falling asleep. For example, don't keep checking the clock and worry about why you are not asleep yet. If you are awake for more than 30 minutes, leave the bed and do not go back to bed until you feel ready to sleep.         Published by [employer].  This content is reviewed periodically and is subject to change as new health information becomes available. The information is intended to inform and educate and is not a replacement for medical evaluation, advice, diagnosis or treatment by a healthcare professional.   Developed by Elda Leger RN, [place], and [employer].   ? 2010 [employer] and/or its affiliates. All Rights Reserved.   Copyright   Clinical Reference Systems 2011  Adult Health Advisor                Follow-ups after your visit        Follow-up notes from your care team     Return in about 2 months (around 9/3/2018).      Who to contact     If you have questions or need follow up information about today's clinic visit or your schedule please contact [facility] directly at [phone].  Normal or non-critical lab and " "imaging results will be communicated to you by MyChart, letter or phone within 4 business days after the clinic has received the results. If you do not hear from us within 7 days, please contact the clinic through [employer] or phone. If you have a critical or abnormal lab result, we will notify you by phone as soon as possible.  Submit refill requests through [employer] or call your pharmacy and they will forward the refill request to us. Please allow 3 business days for your refill to be completed.          Additional Information About Your Visit        [facility] Information     [employer] gives you secure access to your electronic health record. If you see a primary care provider, you can also send messages to your care team and make appointments. If you have questions, please call your primary care clinic.  If you do not have a primary care provider, please call [phone] and they will assist you.        Care EveryWhere ID     This is your Care EveryWhere ID. This could be used by other organizations to access your [facility] medical records  [id number]        Your Vitals Were     Pulse Temperature Height Pulse Oximetry BMI (Body Mass Index)       62 98.4  F (36.9  C) (Oral) 6' 1\" (1.854 m) 98% 24.41 kg/m2        Blood Pressure from Last 3 Encounters:   07/03/18 116/80   04/24/17 104/80   02/24/17 98/60    Weight from Last 3 Encounters:   07/03/18 185 lb (83.9 kg)   04/24/17 178 lb (80.7 kg)   02/24/17 175 lb (79.4 kg)              Today, you had the following     No orders found for display         Today's Medication Changes          These changes are accurate as of 7/3/18  3:06 PM.  If you have any questions, ask your nurse or doctor.               Start taking these medicines.        Dose/Directions    eszopiclone 3 MG tablet   Commonly known as:  LUNESTA   Used for:  Insomnia, unspecified type   Started by:  Saul Neal MD        Dose:  3 mg   Take 1 tablet (3 mg) by mouth At Bedtime   Quantity:  30 tablet "   Refills:  0       triamcinolone 0.1 % cream   Commonly known as:  KENALOG   Used for:  Dermatitis   Started by:  Saul Neal MD        Apply sparingly to affected area three times daily for 14 days.   Quantity:  30 g   Refills:  1         These medicines have changed or have updated prescriptions.        Dose/Directions    escitalopram 20 MG tablet   Commonly known as:  LEXAPRO   This may have changed:  See the new instructions.   Used for:  Generalized anxiety disorder, Obsessive-compulsive disorder, unspecified type   Changed by:  Saul Neal MD        Dose:  20 mg   Take 1 tablet (20 mg) by mouth daily   Quantity:  90 tablet   Refills:  1            Where to get your medicines      These medications were sent to [place] [address] [facility] [address] [facility]  [address] [facility] [address]     Phone:  [phone]     escitalopram 20 MG tablet    triamcinolone 0.1 % cream         Some of these will need a paper prescription and others can be bought over the counter.  Ask your nurse if you have questions.     Bring a paper prescription for each of these medications     eszopiclone 3 MG tablet                Primary Care Provider Office Phone # Fax #    Saul Neal [phone]       [address] [place] [address]        Equal Access to Services     EILEEN SAMPSON AH: Hadii lyubov walter hadasho Soomaali, waaxda luqadaha, qaybta kaalmada adeegyada, josep kidd. So [facility] [phone].    ATENCIÓN: Si habla español, tiene a albert disposición servicios gratuitos de asistencia lingüística. Llame al [phone].    We comply with applicable federal civil rights laws and [place] laws. We do not discriminate on the basis of race, color, national origin, age, disability, sex, sexual orientation, or gender identity.            Thank you!     Thank you for choosing [facility]  for your care. Our goal is always to  provide you with excellent care. Hearing back from our patients is one way we can continue to improve our services. Please take a few minutes to complete the written survey that you may receive in the mail after your visit with us. Thank you!             Your Updated Medication List - Protect others around you: Learn how to safely use, store and throw away your medicines at www.disposemymeds.org.          This list is accurate as of 7/3/18  3:06 PM.  Always use your most recent med list.                   Brand Name Dispense Instructions for use Diagnosis    escitalopram 20 MG tablet    LEXAPRO    90 tablet    Take 1 tablet (20 mg) by mouth daily    Generalized anxiety disorder, Obsessive-compulsive disorder, unspecified type       eszopiclone 3 MG tablet    LUNESTA    30 tablet    Take 1 tablet (3 mg) by mouth At Bedtime    Insomnia, unspecified type       triamcinolone 0.1 % cream    KENALOG    30 g    Apply sparingly to affected area three times daily for 14 days.    Dermatitis

## 2018-07-07 ENCOUNTER — TELEPHONE (OUTPATIENT)
Dept: FAMILY MEDICINE | Facility: CLINIC | Age: 38
End: 2018-07-07

## 2018-07-07 DIAGNOSIS — F42.9 OBSESSIVE-COMPULSIVE DISORDER, UNSPECIFIED TYPE: ICD-10-CM

## 2018-07-07 DIAGNOSIS — F41.1 GENERALIZED ANXIETY DISORDER: Primary | ICD-10-CM

## 2018-07-07 NOTE — TELEPHONE ENCOUNTER
Reason for Call:  Ad saw Dr Neal on 7/3/18. His Lexapro was increased. He feels he is having more muscle rigidity and twitching with the increased dose. These are causing him difficulty sleeping. Ad is requesting to try a different medication. He doesn't want to try Prozac as he said this made him feel like a zombie when he tried it several years ago. He said he is fine with whatever else Dr Neal recommends.    Best phone number to reach pt at is: [phone]  Ok to leave a message with medical info? yes    Pharmacy preferred (if calling for a refill): [facility] [place]    Emelina Edgar  Patient Representative

## 2018-07-16 RX ORDER — VENLAFAXINE HYDROCHLORIDE 37.5 MG/1
37.5 CAPSULE, EXTENDED RELEASE ORAL DAILY
Qty: 14 CAPSULE | Refills: 0 | Status: SHIPPED | OUTPATIENT
Start: 2018-07-16 | End: 2018-10-15

## 2018-07-16 RX ORDER — VENLAFAXINE HYDROCHLORIDE 75 MG/1
75 TABLET, EXTENDED RELEASE ORAL DAILY
Qty: 90 TABLET | Refills: 0 | Status: SHIPPED | OUTPATIENT
Start: 2018-07-16 | End: 2018-10-11

## 2018-07-16 NOTE — TELEPHONE ENCOUNTER
Patient called and noted increased restless legs symptoms - worse since increasing to 20 mg daily.  Will wean 10mg for 7 days then start Effexor.  Patient called

## 2018-10-11 DIAGNOSIS — F42.9 OBSESSIVE-COMPULSIVE DISORDER, UNSPECIFIED TYPE: ICD-10-CM

## 2018-10-11 DIAGNOSIS — F41.1 GENERALIZED ANXIETY DISORDER: ICD-10-CM

## 2018-10-11 RX ORDER — VENLAFAXINE HYDROCHLORIDE 75 MG/1
CAPSULE, EXTENDED RELEASE ORAL
Qty: 90 CAPSULE | Refills: 0 | Status: SHIPPED | OUTPATIENT
Start: 2018-10-11 | End: 2019-01-06

## 2018-10-11 NOTE — TELEPHONE ENCOUNTER
"Requested Prescriptions   Pending Prescriptions Disp Refills     venlafaxine (EFFEXOR-XR) 75 MG 24 hr capsule [Pharmacy Med Name: VENLAFAXINE HCL ER 75 MG CAP]  Last Written Prescription Date:  07/16/2018  Last Fill Quantity: 90 tablet,  # refills: 0   Last office visit: 7/3/2018 with prescribing provider:  Saul Neal MD    Future Office Visit:   Next 5 appointments (look out 90 days)     Oct 16, 2018  8:00 AM CDT   [facility] Saul [facility] MD   [facility]    [address] [place] [id number]   [phone]                  90 capsule 0     Sig: TAKE 1 CAPSULE BY MOUTH EVERY DAY    Serotonin-Norepinephrine Reuptake Inhibitors  Failed    10/11/2018  1:50 AM       Failed - Normal serum creatinine on file in past 12 months    Recent Labs   Lab Test  02/24/17   0829   CR  0.94            Passed - Blood pressure under 140/90 in past 12 months    BP Readings from Last 3 Encounters:   07/03/18 116/80   04/24/17 104/80   02/24/17 98/60                Passed - Recent (12 mo) or future (30 days) visit within the authorizing provider's specialty    Patient had office visit in the last 12 months or has a visit in the next 30 days with authorizing provider or within the authorizing provider's specialty.  See \"Patient Info\" tab in inbasket, or \"Choose Columns\" in Meds & Orders section of the refill encounter.           Passed - Patient is age 18 or older          "

## 2018-10-11 NOTE — TELEPHONE ENCOUNTER
Prescription approved per [facility] Refill Protocol.    Bria BAEZ RN  EP Triage

## 2018-10-27 ENCOUNTER — OFFICE VISIT (OUTPATIENT)
Dept: FAMILY MEDICINE | Facility: CLINIC | Age: 38
End: 2018-10-27
Payer: COMMERCIAL

## 2018-10-27 VITALS
HEIGHT: 73 IN | WEIGHT: 188 LBS | HEART RATE: 76 BPM | BODY MASS INDEX: 24.92 KG/M2 | SYSTOLIC BLOOD PRESSURE: 120 MMHG | TEMPERATURE: 97 F | DIASTOLIC BLOOD PRESSURE: 72 MMHG | OXYGEN SATURATION: 100 %

## 2018-10-27 DIAGNOSIS — Z23 NEED FOR PROPHYLACTIC VACCINATION AND INOCULATION AGAINST INFLUENZA: ICD-10-CM

## 2018-10-27 DIAGNOSIS — K12.0 CANKER SORES ORAL: Primary | ICD-10-CM

## 2018-10-27 PROCEDURE — 90471 IMMUNIZATION ADMIN: CPT | Performed by: NURSE PRACTITIONER

## 2018-10-27 PROCEDURE — 99213 OFFICE O/P EST LOW 20 MIN: CPT | Mod: 25 | Performed by: NURSE PRACTITIONER

## 2018-10-27 PROCEDURE — 90686 IIV4 VACC NO PRSV 0.5 ML IM: CPT | Performed by: NURSE PRACTITIONER

## 2018-10-27 NOTE — PROGRESS NOTES
SUBJECTIVE:   Ad Dubon is a 38 year old male who presents to clinic today for the following health issues:      C/O mouth ,canker sores      Duration: x 1 week    Description (location/character/radiation): The top /of mouth    Intensity:  moderate    Accompanying signs and symptoms: none  Is painful, especially with certain foods.    History (similar episodes/previous evaluation): Hx of Smoking and chew - is not currently using.        Precipitating or alleviating factors: None    Therapies tried and outcome: Salt water rinse     -No other symptoms such a fever, weight loss, no history of canker sores, no enlarged lymph nodes.     Problem list and histories reviewed & adjusted, as indicated.  Additional history: as documented    Patient Active Problem List   Diagnosis     OCD (obsessive compulsive disorder)     Generalized anxiety disorder     CARDIOVASCULAR SCREENING; LDL GOAL LESS THAN 160     Family history of colon cancer - father dx at 39 yo; pt tested neg for RODRÍGUEZ ;  sister tested positive for RODRÍGUEZ syndrome and he tested negative.     Insomnia, unspecified type     Past Surgical History:   Procedure Laterality Date     NO HISTORY OF SURGERY         Social History   Substance Use Topics     Smoking status: Former Smoker     Quit date: 4/21/2008     Smokeless tobacco: Never Used     Alcohol use 0.0 oz/week      Comment: twice a month a couple drinks each time avg     Family History   Problem Relation Age of Onset     Colon Cancer Father      at 39 yo     GASTROINTESTINAL DISEASE Sister 35     colon polyp/precancerous / Rodríguez Syndrome     Melanoma Sister 39     Colon Cancer Paternal Grandfather      Prostate Cancer Paternal Grandfather      Coronary Artery Disease Paternal Grandmother      Cerebrovascular Disease Maternal Grandfather      Hypertension No family hx of      Diabetes No family hx of          Current Outpatient Prescriptions   Medication Sig Dispense Refill     venlafaxine (EFFEXOR-XR)  "75 MG 24 hr capsule TAKE 1 CAPSULE BY MOUTH EVERY DAY 90 capsule 0     triamcinolone (KENALOG) 0.1 % cream Apply sparingly to affected area three times daily for 14 days. 30 g 1     No Known Allergies  Recent Labs   Lab Test  02/24/17   0829  09/29/14   0913  10/25/12   0842   LDL  140*  107  128   HDL  66  70  53   TRIG  58  52  67   CR  0.94  0.98   --    GFRESTIMATED  89  86   --    GFRESTBLACK  >90   GFR Calc    >90   GFR Calc     --    POTASSIUM  4.5  4.4   --    TSH   --    --   0.97      BP Readings from Last 3 Encounters:   10/27/18 120/72   07/03/18 116/80   04/24/17 104/80    Wt Readings from Last 3 Encounters:   10/27/18 188 lb (85.3 kg)   07/03/18 185 lb (83.9 kg)   04/24/17 178 lb (80.7 kg)                  Labs reviewed in EPIC    Reviewed and updated as needed this visit by clinical staff  Tobacco  Allergies  Meds  Med Hx  Surg Hx  Fam Hx  Soc Hx      Reviewed and updated as needed this visit by Provider         ROS:  Constitutional, HEENT, cardiovascular, pulmonary, gi and gu systems are negative, except as otherwise noted.    OBJECTIVE:     /72  Pulse 76  Temp 97  F (36.1  C) (Tympanic)  Ht 6' 1\" (1.854 m)  Wt 188 lb (85.3 kg)  SpO2 100%  BMI 24.8 kg/m2  Body mass index is 24.8 kg/(m^2).  GENERAL: healthy, alert and no distress  HENT: ear canals and TM's normal, nose and mouth without ulcers or lesions except soft palate a grayish open ulcer noted, consistent with a canker sore   NECK: no adenopathy, no asymmetry, masses, or scars and thyroid normal to palpation  RESP: lungs clear to auscultation - no rales, rhonchi or wheezes  CV: regular rate and rhythm, normal S1 S2, no S3 or S4, no murmur, click or rub, no peripheral edema and peripheral pulses strong  PSYCH: mentation appears normal, affect normal/bright  LYMPH: no cervical, supraclavicular, axillary, or inguinal adenopathy    Diagnostic Test Results:  none     ASSESSMENT/PLAN:   Ad was seen " today for mouth lesions.    Diagnoses and all orders for this visit:    Canker sores oral    Need for prophylactic vaccination and inoculation against influenza  -     FLU VACCINE, SPLIT VIRUS, IM (QUADRIVALENT) [19890]- >3 YRS  -     Vaccine Administration, Initial [09284]        See Patient Instructions  Re-assurance provided to patient.  However, he is do for a dental appointment.  Discussed that he should make a dental appointment regardless but if his canker sore persistent past 14 days - he should see the dental as well for further examination/evaluation.  Also discussed that with his tobacco use history, he should see a dental for an oral cancer screening.   Return to clinic if no improvement or symptoms worsen.  Patient verbalized understanding & agreed with plan of care.    BOGDAN Tolbert, [facility] [place]    Injectable Influenza Immunization Documentation    1.  Is the person to be vaccinated sick today?   No    2. Does the person to be vaccinated have an allergy to a component   of the vaccine?   No  Egg Allergy Algorithm Link    3. Has the person to be vaccinated ever had a serious reaction   to influenza vaccine in the past?   No    4. Has the person to be vaccinated ever had Guillain-Barré syndrome?   No    Form completed by patient

## 2018-10-27 NOTE — MR AVS SNAPSHOT
After Visit Summary   10/27/2018    Ad Dubon    MRN: [id number]           Patient Information     Date Of Birth          1980        Visit Information        Provider Department      10/27/2018 10:20 AM Therese Ziegler APRN [facility] [place]        Today's Diagnoses     Need for prophylactic vaccination and inoculation against influenza    -  1    Canker sores oral          Care Instructions    .  Understanding Canker Sores  Canker sores are small, painful sores inside the mouth. They occur most often on the tongue, gums, or insides of the cheeks. The medical term for canker sores is aphthous ulcers.  What causes a canker sore?  The exact cause of canker sores is not known, but they are linked to a number of conditions. These include:    An injury or irritation in the mouth, such as biting the inside of your cheek or braces rubbing    Allergy or sensitivity to certain foods or substances, such as citrus juice or some kinds of toothpaste    Poor nutrition    Emotional stress    Certain infections and illnesses  Canker sores tend to run in families.  What are the symptoms of a canker sore?  These are some common traits of canker sores:    Sores are open and grayish-yellow, surrounded by redness.    Sores are usually painful and sensitive to touch.    Canker sores may be preceded by a burning or tingling sensation a few hours to a few days before the sore appears.    Children and teens tend to get canker sores more often than adults.  How are canker sores treated?  Canker sores usually go away by themselves within 10 to 14 days. There is no cure for canker sores. Treatment focuses on relieving symptoms and shortening outbreaks. Treatments may include:    Prescription or over-the-counter skin treatments to apply to the sores. Steroids for your skin (topical) may protect the canker sores from further irritation and allow them to heal. Topical pain relief medicines may numb  the area and make the sores less painful.    Certain types of toothpaste. These do not contain sodium lauryl sulfate. This type of toothpaste may prevent further aggravation of canker sores.    Oral prescription medicines. These are used for severe cases to help relieve symptoms.    Prescription or over-the-counter pain medicines. These help with discomfort.  What are the complications of a canker sore?  Mouth sores that seem to be canker sores can be signs of a more serious illness. If you have other signs of illness along with mouth sores, you should talk with a healthcare provider. Canker sores can be so painful that they interfere with talking, eating, or drinking.  When should I call my healthcare provider?  Call your healthcare provider right away if you have any of these:    Canker sores that don t go away after 2 weeks    Canker sores that come back more than 3 times a year    Canker sores that are larger than about a half-inch across    Fever of 100.4 F (38 C) or higher, or as directed    Pain that gets worse    You aren t able to eat or drink because of painful sores    Symptoms that don t get better, or symptoms that get worse    New symptoms   Date Last Reviewed: 5/1/2016 2000-2017 [employer]. [address] [place] [address], [place] [address]. All rights reserved. This information is not intended as a substitute for professional medical care. Always follow your healthcare professional's instructions.                Follow-ups after your visit        Who to contact     If you have questions or need follow up information about today's clinic visit or your schedule please contact [facility] directly at [phone].  Normal or non-critical lab and imaging results will be communicated to you by MyChart, letter or phone within 4 business days after the clinic has received the results. If you do not hear from us within 7 days, please contact the clinic through MyChart or phone. If  "you have a critical or abnormal lab result, we will notify you by phone as soon as possible.  Submit refill requests through [employer] or call your pharmacy and they will forward the refill request to us. Please allow 3 business days for your refill to be completed.          Additional Information About Your Visit        [employer] Information     [employer] gives you secure access to your electronic health record. If you see a primary care provider, you can also send messages to your care team and make appointments. If you have questions, please call your primary care clinic.  If you do not have a primary care provider, please call [phone] and they will assist you.        Care EveryWhere ID     This is your Care EveryWhere ID. This could be used by other organizations to access your [facility] medical records  [id number]        Your Vitals Were     Pulse Temperature Height Pulse Oximetry BMI (Body Mass Index)       76 97  F (36.1  C) (Tympanic) 6' 1\" (1.854 m) 100% 24.8 kg/m2        Blood Pressure from Last 3 Encounters:   10/27/18 120/72   07/03/18 116/80   04/24/17 104/80    Weight from Last 3 Encounters:   10/27/18 188 lb (85.3 kg)   07/03/18 185 lb (83.9 kg)   04/24/17 178 lb (80.7 kg)              We Performed the Following     FLU VACCINE, SPLIT VIRUS, IM (QUADRIVALENT) [17410]- >3 YRS     Vaccine Administration, Initial [23970]        Primary Care Provider Office Phone # Fax #    Saul Neal [phone]       [facility] [id number]        Equal Access to Services     [facility]: Hadii lyubov walter hadashabi [place], waaxda luqadaha, qaybta kaalmajosep jones . So [facility] [phone].    ATENCIÓN: Si habla español, tiene a albert disposición servicios gratuitos de asistencia lingüística. Llame al [phone].    We comply with applicable federal civil rights laws and [place] laws. We do not discriminate on the basis of race, color, " national origin, age, disability, sex, sexual orientation, or gender identity.            Thank you!     Thank you for choosing [facility]  for your care. Our goal is always to provide you with excellent care. Hearing back from our patients is one way we can continue to improve our services. Please take a few minutes to complete the written survey that you may receive in the mail after your visit with us. Thank you!             Your Updated Medication List - Protect others around you: Learn how to safely use, store and throw away your medicines at www.disposemymeds.org.          This list is accurate as of 10/27/18 10:39 AM.  Always use your most recent med list.                   Brand Name Dispense Instructions for use Diagnosis    triamcinolone 0.1 % cream    KENALOG    30 g    Apply sparingly to affected area three times daily for 14 days.    Dermatitis       venlafaxine 75 MG 24 hr capsule    EFFEXOR-XR    90 capsule    TAKE 1 CAPSULE BY MOUTH EVERY DAY    Obsessive-compulsive disorder, unspecified type, Generalized anxiety disorder

## 2018-10-27 NOTE — PATIENT INSTRUCTIONS
.  Understanding Canker Sores  Canker sores are small, painful sores inside the mouth. They occur most often on the tongue, gums, or insides of the cheeks. The medical term for canker sores is aphthous ulcers.  What causes a canker sore?  The exact cause of canker sores is not known, but they are linked to a number of conditions. These include:    An injury or irritation in the mouth, such as biting the inside of your cheek or braces rubbing    Allergy or sensitivity to certain foods or substances, such as citrus juice or some kinds of toothpaste    Poor nutrition    Emotional stress    Certain infections and illnesses  Canker sores tend to run in families.  What are the symptoms of a canker sore?  These are some common traits of canker sores:    Sores are open and grayish-yellow, surrounded by redness.    Sores are usually painful and sensitive to touch.    Canker sores may be preceded by a burning or tingling sensation a few hours to a few days before the sore appears.    Children and teens tend to get canker sores more often than adults.  How are canker sores treated?  Canker sores usually go away by themselves within 10 to 14 days. There is no cure for canker sores. Treatment focuses on relieving symptoms and shortening outbreaks. Treatments may include:    Prescription or over-the-counter skin treatments to apply to the sores. Steroids for your skin (topical) may protect the canker sores from further irritation and allow them to heal. Topical pain relief medicines may numb the area and make the sores less painful.    Certain types of toothpaste. These do not contain sodium lauryl sulfate. This type of toothpaste may prevent further aggravation of canker sores.    Oral prescription medicines. These are used for severe cases to help relieve symptoms.    Prescription or over-the-counter pain medicines. These help with discomfort.  What are the complications of a canker sore?  Mouth sores that seem to be canker  sores can be signs of a more serious illness. If you have other signs of illness along with mouth sores, you should talk with a healthcare provider. Canker sores can be so painful that they interfere with talking, eating, or drinking.  When should I call my healthcare provider?  Call your healthcare provider right away if you have any of these:    Canker sores that don t go away after 2 weeks    Canker sores that come back more than 3 times a year    Canker sores that are larger than about a half-inch across    Fever of 100.4 F (38 C) or higher, or as directed    Pain that gets worse    You aren t able to eat or drink because of painful sores    Symptoms that don t get better, or symptoms that get worse    New symptoms   Date Last Reviewed: 5/1/2016 2000-2017 [employer]. [address] [place] [address], [place] [address]. All rights reserved. This information is not intended as a substitute for professional medical care. Always follow your healthcare professional's instructions.

## 2019-01-04 ENCOUNTER — TRANSFERRED RECORDS (OUTPATIENT)
Dept: HEALTH INFORMATION MANAGEMENT | Facility: CLINIC | Age: 39
End: 2019-01-04

## 2019-01-06 DIAGNOSIS — F42.9 OBSESSIVE-COMPULSIVE DISORDER, UNSPECIFIED TYPE: ICD-10-CM

## 2019-01-06 DIAGNOSIS — F41.1 GENERALIZED ANXIETY DISORDER: ICD-10-CM

## 2019-01-07 NOTE — TELEPHONE ENCOUNTER
PHQ-9 SCORE 2/5/2013 7/23/2015 2/24/2017   PHQ-9 Total Score 3 3 -   PHQ-9 Total Score - - 0     TORRI-7 SCORE 7/23/2015 8/28/2015 2/24/2017   Total Score 5 - -   Total Score - 12 2       Pt is due for a fasting PX, updated phq-9 and torri-7     Please call pt to do the above     Thank you     Patricia Torres RN, BSN  [place] [facility]

## 2019-01-07 NOTE — TELEPHONE ENCOUNTER
"Requested Prescriptions   Pending Prescriptions Disp Refills     venlafaxine (EFFEXOR-XR) 75 MG 24 hr capsule [Pharmacy Med Name: VENLAFAXINE HCL ER 75 MG CAP] 90 capsule 0    Last Written Prescription Date:  10.11.18  Last Fill Quantity: 90,  # refills: 0   Last Office Visit: 10/27/2018   Future Office Visit:      Sig: TAKE 1 CAPSULE BY MOUTH EVERY DAY    Serotonin-Norepinephrine Reuptake Inhibitors  Failed - 1/6/2019  8:47 AM       Failed - Normal serum creatinine on file in past 12 months    Recent Labs   Lab Test 02/24/17  0829   CR 0.94            Passed - Blood pressure under 140/90 in past 12 months    BP Readings from Last 3 Encounters:   10/27/18 120/72   07/03/18 116/80   04/24/17 104/80                Passed - Recent (12 mo) or future (30 days) visit within the authorizing provider's specialty    Patient had office visit in the last 12 months or has a visit in the next 30 days with authorizing provider or within the authorizing provider's specialty.  See \"Patient Info\" tab in inbasket, or \"Choose Columns\" in Meds & Orders section of the refill encounter.             Passed - Medication is active on med list       Passed - Patient is age 18 or older          "

## 2019-01-09 NOTE — TELEPHONE ENCOUNTER
Attempt # 1    Called #   Telephone Information:   Mobile [phone]         Left a non detailed VM     Patricia Torres RN, BSN  [place] [facility]

## 2019-01-11 RX ORDER — VENLAFAXINE HYDROCHLORIDE 75 MG/1
CAPSULE, EXTENDED RELEASE ORAL
Qty: 30 CAPSULE | Refills: 0 | Status: SHIPPED | OUTPATIENT
Start: 2019-01-11 | End: 2019-02-12

## 2019-01-11 NOTE — TELEPHONE ENCOUNTER
Called patient @ # below -     Advised of need for PX & questionnaire  PX scheduled for 1/29/19, will do questionnaire at that time (note added to appt)    30 day supply sent    Gabrielle Grider RN  [facility] [facility]

## 2019-02-11 NOTE — PROGRESS NOTES
SUBJECTIVE:   CC: Ad Dubon is an 38 year old male who presents for preventive health visit.     Healthy Habits:    Do you get at least three servings of calcium containing foods daily (dairy, green leafy vegetables, etc.)? yes    Amount of exercise or daily activities, outside of work: Infrequent    Problems taking medications regularly No    Medication side effects: No    Have you had an eye exam in the past two years? no    Do you see a dentist twice per year? yes    Do you have sleep apnea, excessive snoring or daytime drowsiness?no    Anxiety Follow-Up    Status since last visit: No change    Other associated symptoms:None    Complicating factors:   Significant life event: No   Current substance abuse: None  Depression symptoms: No  Medication: Effexor-XL    Ad reports that he's doing quite well using Effexor-XL for his mood however with intermittent sleep trouble.     TORRI-7  TORRI-7 SCORE 8/28/2015 2/24/2017 2/12/2019   Total Score - - -   Total Score 12 2 0     Today's PHQ-2 Score:   PHQ-2 ( 1999 Pfizer) 2/12/2019 7/3/2018   Q1: Little interest or pleasure in doing things 0 0   Q2: Feeling down, depressed or hopeless 0 0   PHQ-2 Score 0 0   Q1: Little interest or pleasure in doing things - -   Q2: Feeling down, depressed or hopeless - -   PHQ-2 Score - -     Abuse: Current or Past(Physical, Sexual or Emotional)- No  Do you feel safe in your environment? Yes    Social History     Tobacco Use     Smoking status: Former Smoker     Last attempt to quit: 4/21/2008     Years since quitting: 10.8     Smokeless tobacco: Never Used   Substance Use Topics     Alcohol use: Yes     Alcohol/week: 0.0 oz     Comment: twice a month a couple drinks each time avg     If you drink alcohol do you typically have >3 drinks per day or >7 drinks per week? No                      Last PSA: No results found for: PSA    Reviewed orders with patient. Reviewed health maintenance and updated orders accordingly - Yes  Labs  "reviewed in EPIC    Reviewed and updated as needed this visit by clinical staff  Tobacco  Allergies  Meds  Problems  Med Hx  Surg Hx  Fam Hx  Soc Hx          Reviewed and updated as needed this visit by Provider  Problems  Surg Hx        ROS:  Constitutional, HEENT, cardiovascular, pulmonary, GI, , musculoskeletal, neuro, skin, endocrine and psych systems are negative, except as otherwise noted.  This document serves as a record of the services and decisions personally performed and made by Saul Neal MD. It was created on his behalf by Aneudy Khan, a trained medical scribe. The creation of this document is based the provider's statements to the medical scribe.  Scribe Aneudy Khan 8:19 AM, February 12, 2019    OBJECTIVE:   /70   Pulse 65   Temp 98  F (36.7  C) (Oral)   Ht 1.854 m (6' 1\")   Wt 88.5 kg (195 lb)   SpO2 98%   BMI 25.73 kg/m    EXAM:  GENERAL: healthy, alert, well nourished, well hydrated, no distress  EYES: Eyes grossly normal to inspection, extraocular movements - intact, and PERRL  HENT: ear canals- normal; TMs- normal; Nose- normal; Mouth- no ulcers, no lesions  NECK: no tenderness, no adenopathy, no asymmetry, no masses, no stiffness; thyroid- normal to palpation  RESP: lungs clear to auscultation - no rales, no rhonchi, no wheezes  CV: regular rates and rhythm, normal S1 S2, no S3 or S4 and no murmur, no click or rub -  ABDOMEN: soft, no tenderness, no  hepatosplenomegaly, no masses, normal bowel sounds  MS: extremities- no gross deformities noted, no edema  SKIN: no suspicious lesions, no rashes  BACK: no CVA tenderness, no paralumbar tenderness  - male: testicles- normal, no atrophy, no masses;  no inguinal hernias  RECTAL- male: no masses, no hemorhoids, Prostate- symmetric, no  nodularity, no masses, no hypertrophy  PSYCH: Alert and oriented times 3; speech- coherent , normal rate and volume; able to articulate logical thoughts, able to abstract reason, no tangential " "thoughts, no hallucinations or delusions, affect- normal  LYMPHATICS: ant. cervical- normal, post. cervical- normal, axillary- normal, supraclavicular- normal, inguinal- normal  NEURO: strength and tone- normal, sensory exam- grossly normal, mentation- intact speech- normal, reflexes- symmetric    ASSESSMENT/PLAN:   Ad was seen today for physical.    Diagnoses and all orders for this visit:    Routine general medical examination at a health care facility  -     Lipid panel reflex to direct LDL Fasting  -     Basic metabolic panel  -     CBC with platelets    Obsessive-compulsive disorder, unspecified type - Stable with current therapy, continue medication  -     venlafaxine (EFFEXOR-XR) 75 MG 24 hr capsule; Take 1 capsule (75 mg) by mouth daily    Generalized anxiety disorder - Stable with current therapy, continue medication  -     venlafaxine (EFFEXOR-XR) 75 MG 24 hr capsule; Take 1 capsule (75 mg) by mouth daily      COUNSELING:  Reviewed preventive health counseling, as reflected in patient instructions       Regular exercise       Healthy diet/nutrition       Vision screening       Hearing screening    BP Readings from Last 1 Encounters:   02/12/19 110/70     Estimated body mass index is 25.73 kg/m  as calculated from the following:    Height as of this encounter: 1.854 m (6' 1\").    Weight as of this encounter: 88.5 kg (195 lb).  Weight management plan: Discussed healthy diet and exercise guidelines   reports that he quit smoking about 10 years ago. he has never used smokeless tobacco.    See patient instruction.     Counseling Resources:  ATP IV Guidelines  Pooled Cohorts Equation Calculator  FRAX Risk Assessment  ICSI Preventive Guidelines  Dietary Guidelines for Americans, 2010  USDA's MyPlate  ASA Prophylaxis  Lung CA Screening    The information in this document, created by the medical scribe for me, accurately reflects the services I personally performed and the decisions made by me. I have reviewed " and approved this document for accuracy prior to leaving the patient care area.  8:28 AM, 02/12/19    Saul Neal MD  [facility] [place]

## 2019-02-12 ENCOUNTER — OFFICE VISIT (OUTPATIENT)
Dept: FAMILY MEDICINE | Facility: CLINIC | Age: 39
End: 2019-02-12
Payer: COMMERCIAL

## 2019-02-12 VITALS
BODY MASS INDEX: 25.84 KG/M2 | HEIGHT: 73 IN | DIASTOLIC BLOOD PRESSURE: 70 MMHG | HEART RATE: 65 BPM | OXYGEN SATURATION: 98 % | TEMPERATURE: 98 F | WEIGHT: 195 LBS | SYSTOLIC BLOOD PRESSURE: 110 MMHG

## 2019-02-12 DIAGNOSIS — Z00.00 ROUTINE GENERAL MEDICAL EXAMINATION AT A HEALTH CARE FACILITY: Primary | ICD-10-CM

## 2019-02-12 DIAGNOSIS — F41.1 GENERALIZED ANXIETY DISORDER: ICD-10-CM

## 2019-02-12 DIAGNOSIS — F42.9 OBSESSIVE-COMPULSIVE DISORDER, UNSPECIFIED TYPE: ICD-10-CM

## 2019-02-12 LAB
ANION GAP SERPL CALCULATED.3IONS-SCNC: 3 MMOL/L (ref 3–14)
BUN SERPL-MCNC: 10 MG/DL (ref 7–30)
CALCIUM SERPL-MCNC: 9.2 MG/DL (ref 8.5–10.1)
CHLORIDE SERPL-SCNC: 107 MMOL/L (ref 94–109)
CHOLEST SERPL-MCNC: 225 MG/DL
CO2 SERPL-SCNC: 28 MMOL/L (ref 20–32)
CREAT SERPL-MCNC: 0.96 MG/DL (ref 0.66–1.25)
ERYTHROCYTE [DISTWIDTH] IN BLOOD BY AUTOMATED COUNT: 12.6 % (ref 10–15)
GFR SERPL CREATININE-BSD FRML MDRD: >90 ML/MIN/{1.73_M2}
GLUCOSE SERPL-MCNC: 92 MG/DL (ref 70–99)
HCT VFR BLD AUTO: 43.5 % (ref 40–53)
HDLC SERPL-MCNC: 55 MG/DL
HGB BLD-MCNC: 15.4 G/DL (ref 13.3–17.7)
LDLC SERPL CALC-MCNC: 145 MG/DL
MCH RBC QN AUTO: 29.8 PG (ref 26.5–33)
MCHC RBC AUTO-ENTMCNC: 35.4 G/DL (ref 31.5–36.5)
MCV RBC AUTO: 84 FL (ref 78–100)
NONHDLC SERPL-MCNC: 170 MG/DL
PLATELET # BLD AUTO: 310 10E9/L (ref 150–450)
POTASSIUM SERPL-SCNC: 4.4 MMOL/L (ref 3.4–5.3)
RBC # BLD AUTO: 5.17 10E12/L (ref 4.4–5.9)
SODIUM SERPL-SCNC: 138 MMOL/L (ref 133–144)
TRIGL SERPL-MCNC: 126 MG/DL
WBC # BLD AUTO: 5.6 10E9/L (ref 4–11)

## 2019-02-12 PROCEDURE — 80048 BASIC METABOLIC PNL TOTAL CA: CPT | Performed by: FAMILY MEDICINE

## 2019-02-12 PROCEDURE — 80061 LIPID PANEL: CPT | Performed by: FAMILY MEDICINE

## 2019-02-12 PROCEDURE — 99396 PREV VISIT EST AGE 40-64: CPT | Performed by: FAMILY MEDICINE

## 2019-02-12 PROCEDURE — 85027 COMPLETE CBC AUTOMATED: CPT | Performed by: FAMILY MEDICINE

## 2019-02-12 PROCEDURE — 36415 COLL VENOUS BLD VENIPUNCTURE: CPT | Performed by: FAMILY MEDICINE

## 2019-02-12 RX ORDER — VENLAFAXINE HYDROCHLORIDE 75 MG/1
75 CAPSULE, EXTENDED RELEASE ORAL DAILY
Qty: 90 CAPSULE | Refills: 1 | Status: SHIPPED | OUTPATIENT
Start: 2019-02-12 | End: 2019-11-22

## 2019-02-12 ASSESSMENT — MIFFLIN-ST. JEOR: SCORE: 1848.39

## 2019-02-12 ASSESSMENT — ANXIETY QUESTIONNAIRES
2. NOT BEING ABLE TO STOP OR CONTROL WORRYING: NOT AT ALL
IF YOU CHECKED OFF ANY PROBLEMS ON THIS QUESTIONNAIRE, HOW DIFFICULT HAVE THESE PROBLEMS MADE IT FOR YOU TO DO YOUR WORK, TAKE CARE OF THINGS AT HOME, OR GET ALONG WITH OTHER PEOPLE: NOT DIFFICULT AT ALL
5. BEING SO RESTLESS THAT IT IS HARD TO SIT STILL: NOT AT ALL
GAD7 TOTAL SCORE: 0
6. BECOMING EASILY ANNOYED OR IRRITABLE: NOT AT ALL
7. FEELING AFRAID AS IF SOMETHING AWFUL MIGHT HAPPEN: NOT AT ALL
3. WORRYING TOO MUCH ABOUT DIFFERENT THINGS: NOT AT ALL
1. FEELING NERVOUS, ANXIOUS, OR ON EDGE: NOT AT ALL

## 2019-02-12 ASSESSMENT — PATIENT HEALTH QUESTIONNAIRE - PHQ9
5. POOR APPETITE OR OVEREATING: NOT AT ALL
SUM OF ALL RESPONSES TO PHQ QUESTIONS 1-9: 3

## 2019-02-13 ASSESSMENT — ANXIETY QUESTIONNAIRES: GAD7 TOTAL SCORE: 0

## 2019-02-14 NOTE — RESULT ENCOUNTER NOTE
Dear Ad,    Here is a summary of your recent test results:  -Normal red blood cell (hgb) levels, normal white blood cell count and normal platelet levels.  -Cholesterol levels (LDL,HDL, Triglycerides) are okay.  ADVISE: rechecking  in 1 year.  -Kidney function is normal (Cr, GFR), Sodium is normal, Potassium is normal, Calcium is normal, Glucose is normal.     For additional lab test information, labtestsonline.org is an excellent reference.           Thank you very much for trusting me and [facility].     Healthy regards,  Riki Neal MD

## 2019-09-27 ENCOUNTER — HEALTH MAINTENANCE LETTER (OUTPATIENT)
Age: 39
End: 2019-09-27

## 2019-11-22 DIAGNOSIS — F41.1 GENERALIZED ANXIETY DISORDER: ICD-10-CM

## 2019-11-22 DIAGNOSIS — F42.9 OBSESSIVE-COMPULSIVE DISORDER, UNSPECIFIED TYPE: ICD-10-CM

## 2019-11-22 NOTE — TELEPHONE ENCOUNTER
"Requested Prescriptions   Pending Prescriptions Disp Refills     venlafaxine (EFFEXOR-XR) 75 MG 24 hr capsule [Pharmacy Med Name: VENLAFAXINE HCL ER 75 MG CAP]        Last Written Prescription Date:  2.12.19  Last Fill Quantity: 90 capsule,  # refills: 1   Last office visit: 2/12/2019 with prescribing provider:  Saul Neal MD           Future Office Visit:       90 capsule 0     Sig: TAKE 1 CAPSULE (75 MG) BY MOUTH DAILY       Serotonin-Norepinephrine Reuptake Inhibitors  Passed - 11/22/2019  7:33 AM        Passed - Blood pressure under 140/90 in past 12 months     BP Readings from Last 3 Encounters:   02/12/19 110/70   10/27/18 120/72   07/03/18 116/80                 Passed - Recent (12 mo) or future (30 days) visit within the authorizing provider's specialty     Patient has had an office visit with the authorizing provider or a provider within the authorizing providers department within the previous 12 mos or has a future within next 30 days. See \"Patient Info\" tab in inbasket, or \"Choose Columns\" in Meds & Orders section of the refill encounter.              Passed - Medication is active on med list        Passed - Patient is age 18 or older        Passed - Normal serum creatinine on file in past 12 months     Recent Labs   Lab Test 02/12/19  0830   CR 0.96             "

## 2019-11-25 RX ORDER — VENLAFAXINE HYDROCHLORIDE 75 MG/1
75 CAPSULE, EXTENDED RELEASE ORAL DAILY
Qty: 30 CAPSULE | Refills: 0 | Status: SHIPPED | OUTPATIENT
Start: 2019-11-25 | End: 2020-02-12

## 2019-11-25 NOTE — TELEPHONE ENCOUNTER
Patient due for fasting office visit- 30 days supply given.  Routing to team to schedule appointment     Lianna GAITAN RN  [facility]  [phone]

## 2019-11-25 NOTE — TELEPHONE ENCOUNTER
Patient doesn't need right now-- will call pharmacy to cancel the refill.   Will schedule an appointment in February when his physical is due.      Lianna Willis

## 2020-02-10 DIAGNOSIS — F41.1 GENERALIZED ANXIETY DISORDER: ICD-10-CM

## 2020-02-10 DIAGNOSIS — F42.9 OBSESSIVE-COMPULSIVE DISORDER, UNSPECIFIED TYPE: ICD-10-CM

## 2020-02-10 NOTE — TELEPHONE ENCOUNTER
"Requested Prescriptions   Pending Prescriptions Disp Refills     venlafaxine (EFFEXOR-XR) 75 MG 24 hr capsule [Pharmacy Med Name: VENLAFAXINE HCL ER 75 MG CAP]      Last Written Prescription Date:  11.25.19  Last Fill Quantity: 30 capsule,  # refills: 0   Last office visit: 2/12/2019 with prescribing provider:  Saul Neal MD           Future Office Visit:       90 capsule 0     Sig: TAKE 1 CAPSULE (75 MG) BY MOUTH DAILY       Serotonin-Norepinephrine Reuptake Inhibitors  Passed - 2/10/2020 11:00 AM        Passed - Blood pressure under 140/90 in past 12 months     BP Readings from Last 3 Encounters:   02/12/19 110/70   10/27/18 120/72   07/03/18 116/80                 Passed - Recent (12 mo) or future (30 days) visit within the authorizing provider's specialty     Patient has had an office visit with the authorizing provider or a provider within the authorizing providers department within the previous 12 mos or has a future within next 30 days. See \"Patient Info\" tab in inbasket, or \"Choose Columns\" in Meds & Orders section of the refill encounter.              Passed - Medication is active on med list        Passed - Patient is age 18 or older        Passed - Normal serum creatinine on file in past 12 months     Recent Labs   Lab Test 02/12/19  0830   CR 0.96             "

## 2020-02-12 RX ORDER — VENLAFAXINE HYDROCHLORIDE 75 MG/1
75 CAPSULE, EXTENDED RELEASE ORAL DAILY
Qty: 30 CAPSULE | Refills: 0 | Status: SHIPPED | OUTPATIENT
Start: 2020-02-12 | End: 2020-03-18

## 2020-02-12 NOTE — TELEPHONE ENCOUNTER
Patient states that he takes this daily- miss doses sometimes but pharmacy has been refilling  Has one week left in his current bottle  Lianna GAITANRN BSN  [facility]  [phone]

## 2020-03-08 DIAGNOSIS — F42.9 OBSESSIVE-COMPULSIVE DISORDER, UNSPECIFIED TYPE: ICD-10-CM

## 2020-03-08 DIAGNOSIS — F41.1 GENERALIZED ANXIETY DISORDER: ICD-10-CM

## 2020-03-09 NOTE — TELEPHONE ENCOUNTER
"Requested Prescriptions   Pending Prescriptions Disp Refills     venlafaxine (EFFEXOR-XR) 75 MG 24 hr capsule [Pharmacy Med Name: VENLAFAXINE HCL ER 75 MG CAP]        Last Written Prescription Date:  2.12.20  Last Fill Quantity: 30 capsule,  # refills: 0   Last office visit: 2/12/2019 with prescribing provider:  Saul Neal MD         Future Office Visit:       30 capsule 0     Sig: TAKE 1 CAPSULE BY MOUTH EVERY DAY       Serotonin-Norepinephrine Reuptake Inhibitors  Failed - 3/8/2020  9:31 AM        Failed - Blood pressure under 140/90 in past 12 months     BP Readings from Last 3 Encounters:   02/12/19 110/70   10/27/18 120/72   07/03/18 116/80                 Failed - Recent (12 mo) or future (30 days) visit within the authorizing provider's specialty     Patient has had an office visit with the authorizing provider or a provider within the authorizing providers department within the previous 12 mos or has a future within next 30 days. See \"Patient Info\" tab in inbasket, or \"Choose Columns\" in Meds & Orders section of the refill encounter.              Failed - Normal serum creatinine on file in past 12 months     Recent Labs   Lab Test 02/12/19  0830   CR 0.96             Passed - Medication is active on med list        Passed - Patient is age 18 or older           "

## 2020-03-10 NOTE — TELEPHONE ENCOUNTER
Patient due for fasting office visit-   Routing to team to schedule appointment   Route to provider after appointment chanell GAITANRN  [facility]  [phone]

## 2020-03-15 ENCOUNTER — HEALTH MAINTENANCE LETTER (OUTPATIENT)
Age: 40
End: 2020-03-15

## 2020-03-16 NOTE — TELEPHONE ENCOUNTER
Due to COVID restrictions and scheduling limitations, can this appointment be done as an e-visit, telephone call or is it medically necessary for patient to be seen prior to July?  Please advise.    Sharee Cruz

## 2020-03-17 NOTE — TELEPHONE ENCOUNTER
This can be a telephone visit.  Please assist with scheduling.    BELÉN Kent, RN, PHN  [facility]  Office: [phone]  Fax: [phone]

## 2020-03-18 RX ORDER — VENLAFAXINE HYDROCHLORIDE 75 MG/1
CAPSULE, EXTENDED RELEASE ORAL
Qty: 90 CAPSULE | Refills: 1 | Status: SHIPPED | OUTPATIENT
Start: 2020-03-18 | End: 2020-08-03

## 2020-06-22 ENCOUNTER — TELEPHONE (OUTPATIENT)
Dept: FAMILY MEDICINE | Facility: CLINIC | Age: 40
End: 2020-06-22

## 2020-06-22 NOTE — TELEPHONE ENCOUNTER
Reason for call:  Other   Patient called regarding (reason for call): call back  Additional comments:  PT gets every 2 yrs poison ivy. It is just starting. PT wants to stay on front end of it, questions about limiting it or how to deal with it, or a shot.     Phone number to reach patient:  Cell number on file:    Telephone Information:   Mobile [phone]       Best Time:  Any     Can we leave a detailed message on this number?  YES    Travel screening: Not Applicable

## 2020-06-23 NOTE — TELEPHONE ENCOUNTER
Called # below     Pt stated that he just got the rash and its not that bad but was told that he can just get steroids to help with the spread     RN advised that pt will need to be seen for this if he needs steroids, pt stated he did not want to be seen yet since it is not that bad   He was golfing last weekend and he got into some poison ivy and had a rash on his arms and its a bit better but knows its going to get worse he would like to know what he can do,     Rn advised to keep area clean, wash towels after use and use calamine lotion and or hydrocortisone cream and can take benadryl for the itch and if things worsen to be seen     Patient stated an understanding and agreed with plan.    ,Patricia Torres RN, BSN  [facility]

## 2020-08-02 DIAGNOSIS — F41.1 GENERALIZED ANXIETY DISORDER: ICD-10-CM

## 2020-08-02 DIAGNOSIS — F42.9 OBSESSIVE-COMPULSIVE DISORDER, UNSPECIFIED TYPE: ICD-10-CM

## 2020-08-03 RX ORDER — VENLAFAXINE HYDROCHLORIDE 75 MG/1
CAPSULE, EXTENDED RELEASE ORAL
Qty: 90 CAPSULE | Refills: 0 | Status: SHIPPED | OUTPATIENT
Start: 2020-08-03

## 2020-08-03 NOTE — TELEPHONE ENCOUNTER
A 90 day supply is given, patient is due for an office visit.  Please call to  assist the patient in scheduling an appointment.  ASHLEY Miguel, RN  [employer] [facility]

## 2021-01-09 ENCOUNTER — HEALTH MAINTENANCE LETTER (OUTPATIENT)
Age: 41
End: 2021-01-09

## 2021-01-23 DIAGNOSIS — F42.9 OBSESSIVE-COMPULSIVE DISORDER, UNSPECIFIED TYPE: ICD-10-CM

## 2021-01-23 DIAGNOSIS — F41.1 GENERALIZED ANXIETY DISORDER: ICD-10-CM

## 2021-01-25 NOTE — TELEPHONE ENCOUNTER
LOV: 2/12/2019  Patient due for physical  No future appt scheduled    Routing to [facility] to assist in scheduling      Gabrielle Grider RN  [facility]

## 2021-02-18 RX ORDER — VENLAFAXINE HYDROCHLORIDE 75 MG/1
CAPSULE, EXTENDED RELEASE ORAL
Qty: 90 CAPSULE | Refills: 0 | OUTPATIENT
Start: 2021-02-18

## 2021-02-18 NOTE — TELEPHONE ENCOUNTER
Pt is no longer on this medication is getting care from a mental health clinic he states.  But patient will call back to schedule a physical since he is due for this.    Routing to [facility] refill so they can close encounter.    Paige Lazo MA

## 2021-02-18 NOTE — TELEPHONE ENCOUNTER
Denied to note below.    Jose Alfredo ENGLE RN   [facility] [place] Triage

## 2021-04-05 ENCOUNTER — TRANSFERRED RECORDS (OUTPATIENT)
Dept: HEALTH INFORMATION MANAGEMENT | Facility: CLINIC | Age: 41
End: 2021-04-05

## 2021-05-08 ENCOUNTER — HEALTH MAINTENANCE LETTER (OUTPATIENT)
Age: 41
End: 2021-05-08

## 2021-09-29 ENCOUNTER — TRANSFERRED RECORDS (OUTPATIENT)
Dept: HEALTH INFORMATION MANAGEMENT | Facility: CLINIC | Age: 41
End: 2021-09-29

## 2021-10-23 ENCOUNTER — HEALTH MAINTENANCE LETTER (OUTPATIENT)
Age: 41
End: 2021-10-23

## 2022-06-04 ENCOUNTER — HEALTH MAINTENANCE LETTER (OUTPATIENT)
Age: 42
End: 2022-06-04

## 2022-10-09 ENCOUNTER — HEALTH MAINTENANCE LETTER (OUTPATIENT)
Age: 42
End: 2022-10-09

## 2023-06-10 ENCOUNTER — HEALTH MAINTENANCE LETTER (OUTPATIENT)
Age: 43
End: 2023-06-10